# Patient Record
Sex: MALE | Race: WHITE | NOT HISPANIC OR LATINO | URBAN - METROPOLITAN AREA
[De-identification: names, ages, dates, MRNs, and addresses within clinical notes are randomized per-mention and may not be internally consistent; named-entity substitution may affect disease eponyms.]

---

## 2017-04-21 ENCOUNTER — OUTPATIENT (OUTPATIENT)
Dept: OUTPATIENT SERVICES | Facility: HOSPITAL | Age: 72
LOS: 1 days | Discharge: HOME | End: 2017-04-21

## 2017-06-27 DIAGNOSIS — C67.9 MALIGNANT NEOPLASM OF BLADDER, UNSPECIFIED: ICD-10-CM

## 2017-06-27 DIAGNOSIS — I25.10 ATHEROSCLEROTIC HEART DISEASE OF NATIVE CORONARY ARTERY WITHOUT ANGINA PECTORIS: ICD-10-CM

## 2017-06-27 DIAGNOSIS — G47.33 OBSTRUCTIVE SLEEP APNEA (ADULT) (PEDIATRIC): ICD-10-CM

## 2017-06-27 DIAGNOSIS — E78.5 HYPERLIPIDEMIA, UNSPECIFIED: ICD-10-CM

## 2017-06-27 DIAGNOSIS — I10 ESSENTIAL (PRIMARY) HYPERTENSION: ICD-10-CM

## 2017-06-27 DIAGNOSIS — J45.909 UNSPECIFIED ASTHMA, UNCOMPLICATED: ICD-10-CM

## 2017-06-27 DIAGNOSIS — J44.9 CHRONIC OBSTRUCTIVE PULMONARY DISEASE, UNSPECIFIED: ICD-10-CM

## 2017-12-29 PROBLEM — Z00.00 ENCOUNTER FOR PREVENTIVE HEALTH EXAMINATION: Status: ACTIVE | Noted: 2017-12-29

## 2018-02-02 ENCOUNTER — APPOINTMENT (OUTPATIENT)
Dept: UROLOGY | Facility: CLINIC | Age: 73
End: 2018-02-02
Payer: MEDICARE

## 2018-02-02 VITALS
WEIGHT: 218 LBS | HEART RATE: 57 BPM | HEIGHT: 71 IN | DIASTOLIC BLOOD PRESSURE: 79 MMHG | BODY MASS INDEX: 30.52 KG/M2 | SYSTOLIC BLOOD PRESSURE: 138 MMHG

## 2018-02-02 LAB
BILIRUB UR QL STRIP: NORMAL
CLARITY UR: CLEAR
COLLECTION METHOD: NORMAL
GLUCOSE UR-MCNC: NORMAL
HCG UR QL: NORMAL EU/DL
HGB UR QL STRIP.AUTO: NORMAL
KETONES UR-MCNC: NORMAL
LEUKOCYTE ESTERASE UR QL STRIP: NORMAL
NITRITE UR QL STRIP: NORMAL
PH UR STRIP: 6
PROT UR STRIP-MCNC: NORMAL
SP GR UR STRIP: 1.02

## 2018-02-02 PROCEDURE — 99214 OFFICE O/P EST MOD 30 MIN: CPT | Mod: 25

## 2018-02-02 PROCEDURE — 52000 CYSTOURETHROSCOPY: CPT

## 2018-02-02 PROCEDURE — 81003 URINALYSIS AUTO W/O SCOPE: CPT | Mod: QW

## 2018-02-02 RX ORDER — NYSTATIN AND TRIAMCINOLONE ACETONIDE 100000; 1 MG/G; MG/G
100000-0.1 CREAM TOPICAL
Qty: 30 | Refills: 0 | Status: ACTIVE | COMMUNITY
Start: 2017-11-20

## 2018-02-02 RX ORDER — HYDROXYCHLOROQUINE SULFATE 200 MG/1
200 TABLET, FILM COATED ORAL
Qty: 180 | Refills: 0 | Status: ACTIVE | COMMUNITY
Start: 2017-04-24

## 2018-02-02 RX ORDER — NEOMYCIN SULFATE, POLYMYXIN B SULFATE AND DEXAMETHASONE 3.5; 10000; 1 MG/ML; [USP'U]/ML; MG/ML
3.5-10000-0.1 SUSPENSION OPHTHALMIC
Qty: 5 | Refills: 0 | Status: ACTIVE | COMMUNITY
Start: 2017-10-23

## 2018-02-02 RX ORDER — PREDNISONE 5 MG/1
5 TABLET ORAL
Qty: 270 | Refills: 0 | Status: ACTIVE | COMMUNITY
Start: 2017-04-24

## 2018-02-02 RX ORDER — ATENOLOL 25 MG/1
25 TABLET ORAL
Qty: 90 | Refills: 0 | Status: ACTIVE | COMMUNITY
Start: 2016-12-27

## 2018-02-05 ENCOUNTER — RX RENEWAL (OUTPATIENT)
Age: 73
End: 2018-02-05

## 2018-02-05 ENCOUNTER — EMERGENCY (EMERGENCY)
Facility: HOSPITAL | Age: 73
LOS: 1 days | Discharge: HOME | End: 2018-02-05

## 2018-02-05 VITALS
HEART RATE: 72 BPM | TEMPERATURE: 100 F | SYSTOLIC BLOOD PRESSURE: 135 MMHG | DIASTOLIC BLOOD PRESSURE: 88 MMHG | OXYGEN SATURATION: 99 % | RESPIRATION RATE: 18 BRPM

## 2018-02-06 RX ORDER — CIPROFLOXACIN LACTATE 400MG/40ML
1 VIAL (ML) INTRAVENOUS
Qty: 0 | Refills: 0 | COMMUNITY
Start: 2018-02-06 | End: 2018-02-16

## 2018-02-09 ENCOUNTER — APPOINTMENT (OUTPATIENT)
Dept: UROLOGY | Facility: CLINIC | Age: 73
End: 2018-02-09
Payer: MEDICARE

## 2018-02-09 ENCOUNTER — OUTPATIENT (OUTPATIENT)
Dept: OUTPATIENT SERVICES | Facility: HOSPITAL | Age: 73
LOS: 1 days | Discharge: HOME | End: 2018-02-09

## 2018-02-09 VITALS
SYSTOLIC BLOOD PRESSURE: 112 MMHG | HEIGHT: 71 IN | DIASTOLIC BLOOD PRESSURE: 73 MMHG | BODY MASS INDEX: 30.52 KG/M2 | HEART RATE: 61 BPM | WEIGHT: 218 LBS

## 2018-02-09 VITALS
RESPIRATION RATE: 18 BRPM | TEMPERATURE: 97 F | DIASTOLIC BLOOD PRESSURE: 76 MMHG | SYSTOLIC BLOOD PRESSURE: 118 MMHG | OXYGEN SATURATION: 98 % | HEART RATE: 64 BPM | WEIGHT: 218.04 LBS | HEIGHT: 71 IN

## 2018-02-09 DIAGNOSIS — C67.9 MALIGNANT NEOPLASM OF BLADDER, UNSPECIFIED: ICD-10-CM

## 2018-02-09 DIAGNOSIS — Z98.890 OTHER SPECIFIED POSTPROCEDURAL STATES: Chronic | ICD-10-CM

## 2018-02-09 DIAGNOSIS — Z01.818 ENCOUNTER FOR OTHER PREPROCEDURAL EXAMINATION: ICD-10-CM

## 2018-02-09 LAB
APPEARANCE UR: (no result)
APTT BLD: 30.4 SEC — SIGNIFICANT CHANGE UP (ref 27–39.2)
BACTERIA # UR AUTO: (no result) /HPF
BASOPHILS # BLD AUTO: 0.09 K/UL — SIGNIFICANT CHANGE UP (ref 0–0.2)
BASOPHILS NFR BLD AUTO: 1.2 % — HIGH (ref 0–1)
BILIRUB UR QL STRIP: NORMAL
BILIRUB UR-MCNC: NEGATIVE — SIGNIFICANT CHANGE UP
CLARITY UR: CLEAR
COLLECTION METHOD: NORMAL
COLOR SPEC: YELLOW — SIGNIFICANT CHANGE UP
DIFF PNL FLD: (no result)
EOSINOPHIL # BLD AUTO: 0.32 K/UL — SIGNIFICANT CHANGE UP (ref 0–0.7)
EOSINOPHIL NFR BLD AUTO: 4.1 % — SIGNIFICANT CHANGE UP (ref 0–8)
EPI CELLS # UR: (no result) /HPF
GLUCOSE UR QL: NEGATIVE MG/DL — SIGNIFICANT CHANGE UP
GLUCOSE UR-MCNC: NORMAL
HCG UR QL: NORMAL EU/DL
HCT VFR BLD CALC: 35.2 % — LOW (ref 42–52)
HGB BLD-MCNC: 11.6 G/DL — LOW (ref 14–18)
HGB UR QL STRIP.AUTO: 50
IMM GRANULOCYTES NFR BLD AUTO: 0.4 % — HIGH (ref 0.1–0.3)
INR BLD: 1.12 RATIO — SIGNIFICANT CHANGE UP (ref 0.65–1.3)
KETONES UR-MCNC: NEGATIVE — SIGNIFICANT CHANGE UP
KETONES UR-MCNC: NORMAL
LEUKOCYTE ESTERASE UR QL STRIP: 500
LEUKOCYTE ESTERASE UR-ACNC: (no result)
LYMPHOCYTES # BLD AUTO: 2.06 K/UL — SIGNIFICANT CHANGE UP (ref 1.2–3.4)
LYMPHOCYTES # BLD AUTO: 26.6 % — SIGNIFICANT CHANGE UP (ref 20.5–51.1)
MCHC RBC-ENTMCNC: 30.7 PG — SIGNIFICANT CHANGE UP (ref 27–31)
MCHC RBC-ENTMCNC: 33 G/DL — SIGNIFICANT CHANGE UP (ref 32–37)
MCV RBC AUTO: 93.1 FL — SIGNIFICANT CHANGE UP (ref 80–94)
MONOCYTES # BLD AUTO: 0.81 K/UL — HIGH (ref 0.1–0.6)
MONOCYTES NFR BLD AUTO: 10.5 % — HIGH (ref 1.7–9.3)
NEUTROPHILS # BLD AUTO: 4.44 K/UL — SIGNIFICANT CHANGE UP (ref 1.4–6.5)
NEUTROPHILS NFR BLD AUTO: 57.2 % — SIGNIFICANT CHANGE UP (ref 42.2–75.2)
NITRITE UR QL STRIP: NORMAL
NITRITE UR-MCNC: POSITIVE
NRBC # BLD: 0 /100 WBCS — SIGNIFICANT CHANGE UP (ref 0–0)
PH UR STRIP: 5
PH UR: 6 — SIGNIFICANT CHANGE UP (ref 5–8)
PLATELET # BLD AUTO: 223 K/UL — SIGNIFICANT CHANGE UP (ref 130–400)
PROT UR STRIP-MCNC: 30
PROT UR-MCNC: 100 MG/DL
PROTHROM AB SERPL-ACNC: 12.1 SEC — SIGNIFICANT CHANGE UP (ref 9.95–12.87)
RBC # BLD: 3.78 M/UL — LOW (ref 4.7–6.1)
RBC # FLD: 14.6 % — HIGH (ref 11.5–14.5)
RBC CASTS # UR COMP ASSIST: >50 /HPF
SP GR SPEC: 1.02 — SIGNIFICANT CHANGE UP (ref 1.01–1.03)
SP GR UR STRIP: 1.02
UROBILINOGEN FLD QL: 0.2 MG/DL — SIGNIFICANT CHANGE UP (ref 0.2–0.2)
WBC # BLD: 7.75 K/UL — SIGNIFICANT CHANGE UP (ref 4.8–10.8)
WBC # FLD AUTO: 7.75 K/UL — SIGNIFICANT CHANGE UP (ref 4.8–10.8)
WBC UR QL: >50 /HPF

## 2018-02-09 PROCEDURE — 81003 URINALYSIS AUTO W/O SCOPE: CPT | Mod: QW

## 2018-02-09 PROCEDURE — 51798 US URINE CAPACITY MEASURE: CPT

## 2018-02-09 PROCEDURE — 99213 OFFICE O/P EST LOW 20 MIN: CPT

## 2018-02-09 RX ORDER — LANSOPRAZOLE 30 MG/1
30 CAPSULE, DELAYED RELEASE ORAL
Qty: 60 | Refills: 0 | Status: ACTIVE | COMMUNITY
Start: 2018-02-01

## 2018-02-09 RX ORDER — FLUTICASONE PROPIONATE AND SALMETEROL 50; 100 UG/1; UG/1
100-50 POWDER RESPIRATORY (INHALATION)
Qty: 180 | Refills: 0 | Status: ACTIVE | COMMUNITY
Start: 2018-01-05

## 2018-02-09 RX ORDER — ALBUTEROL SULFATE 90 UG/1
108 (90 BASE) AEROSOL, METERED RESPIRATORY (INHALATION)
Qty: 36 | Refills: 0 | Status: ACTIVE | COMMUNITY
Start: 2017-11-03

## 2018-02-09 RX ORDER — FLUTICASONE PROPIONATE 50 UG/1
50 SPRAY, METERED NASAL
Qty: 16 | Refills: 0 | Status: ACTIVE | COMMUNITY
Start: 2017-12-11

## 2018-02-09 NOTE — H&P PST ADULT - ATTENDING COMMENTS
For turbt.  Risks, benefits, alternatives discussed including likely catheter.  Had Ecoli uti and was treated with macrobid.  negative culture this week

## 2018-02-09 NOTE — H&P PST ADULT - HISTORY OF PRESENT ILLNESS
pt with hx bladder ca-- polyps returned now for planned cystoscopy transurethral resection of bladder tumor 2/15/18 with dr mar

## 2018-02-09 NOTE — H&P PST ADULT - REASON FOR ADMISSION
PT CURRENTLY DENIES CP SOB PALPITATIONS, OR URI IN PAST 2 WEEKS  c/o dysuria- on abx  EX MARINA 1-2 FOS W/O SOB

## 2018-02-09 NOTE — H&P PST ADULT - PMH
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

## 2018-02-10 LAB
ALBUMIN SERPL ELPH-MCNC: 3.4 G/DL — SIGNIFICANT CHANGE UP (ref 3–5.5)
ALP SERPL-CCNC: 63 U/L — SIGNIFICANT CHANGE UP (ref 30–115)
ALT FLD-CCNC: 32 U/L — SIGNIFICANT CHANGE UP (ref 0–41)
ANION GAP SERPL CALC-SCNC: 8 MMOL/L — SIGNIFICANT CHANGE UP (ref 7–14)
AST SERPL-CCNC: 39 U/L — SIGNIFICANT CHANGE UP (ref 0–41)
BILIRUB SERPL-MCNC: 0.4 MG/DL — SIGNIFICANT CHANGE UP (ref 0.2–1.2)
BUN SERPL-MCNC: 35 MG/DL — HIGH (ref 10–20)
CALCIUM SERPL-MCNC: 9.7 MG/DL — SIGNIFICANT CHANGE UP (ref 8.5–10.1)
CHLORIDE SERPL-SCNC: 112 MMOL/L — HIGH (ref 98–110)
CO2 SERPL-SCNC: 25 MMOL/L — SIGNIFICANT CHANGE UP (ref 17–32)
CREAT SERPL-MCNC: 1.6 MG/DL — HIGH (ref 0.7–1.5)
GLUCOSE SERPL-MCNC: 87 MG/DL — SIGNIFICANT CHANGE UP (ref 70–110)
POTASSIUM SERPL-MCNC: 4.4 MMOL/L — SIGNIFICANT CHANGE UP (ref 3.5–5)
POTASSIUM SERPL-SCNC: 4.4 MMOL/L — SIGNIFICANT CHANGE UP (ref 3.5–5)
PROT SERPL-MCNC: 6.2 G/DL — SIGNIFICANT CHANGE UP (ref 6–8)
SODIUM SERPL-SCNC: 145 MMOL/L — SIGNIFICANT CHANGE UP (ref 135–146)

## 2018-02-13 ENCOUNTER — RESULT REVIEW (OUTPATIENT)
Age: 73
End: 2018-02-13

## 2018-02-23 ENCOUNTER — OUTPATIENT (OUTPATIENT)
Dept: OUTPATIENT SERVICES | Facility: HOSPITAL | Age: 73
LOS: 1 days | Discharge: HOME | End: 2018-02-23

## 2018-02-23 ENCOUNTER — MESSAGE (OUTPATIENT)
Age: 73
End: 2018-02-23

## 2018-02-23 DIAGNOSIS — Z98.890 OTHER SPECIFIED POSTPROCEDURAL STATES: Chronic | ICD-10-CM

## 2018-02-24 DIAGNOSIS — N39.0 URINARY TRACT INFECTION, SITE NOT SPECIFIED: ICD-10-CM

## 2018-02-27 LAB — BACTERIA UR CULT: NORMAL

## 2018-03-01 ENCOUNTER — RESULT REVIEW (OUTPATIENT)
Age: 73
End: 2018-03-01

## 2018-03-01 ENCOUNTER — APPOINTMENT (OUTPATIENT)
Dept: UROLOGY | Facility: HOSPITAL | Age: 73
End: 2018-03-01
Payer: MEDICARE

## 2018-03-01 ENCOUNTER — OUTPATIENT (OUTPATIENT)
Dept: OUTPATIENT SERVICES | Facility: HOSPITAL | Age: 73
LOS: 1 days | Discharge: HOME | End: 2018-03-01

## 2018-03-01 VITALS
SYSTOLIC BLOOD PRESSURE: 120 MMHG | DIASTOLIC BLOOD PRESSURE: 69 MMHG | WEIGHT: 214.07 LBS | RESPIRATION RATE: 16 BRPM | HEART RATE: 54 BPM | OXYGEN SATURATION: 99 % | HEIGHT: 71 IN | TEMPERATURE: 96 F

## 2018-03-01 VITALS — DIASTOLIC BLOOD PRESSURE: 75 MMHG | HEART RATE: 51 BPM | SYSTOLIC BLOOD PRESSURE: 136 MMHG

## 2018-03-01 DIAGNOSIS — Z98.890 OTHER SPECIFIED POSTPROCEDURAL STATES: Chronic | ICD-10-CM

## 2018-03-01 PROCEDURE — 52214 CYSTOSCOPY AND TREATMENT: CPT | Mod: 59

## 2018-03-01 PROCEDURE — 52204 CYSTOSCOPY W/BIOPSY(S): CPT | Mod: 59

## 2018-03-01 RX ORDER — ONDANSETRON 8 MG/1
4 TABLET, FILM COATED ORAL ONCE
Qty: 0 | Refills: 0 | Status: DISCONTINUED | OUTPATIENT
Start: 2018-03-01 | End: 2018-03-16

## 2018-03-01 RX ORDER — SODIUM CHLORIDE 9 MG/ML
1000 INJECTION, SOLUTION INTRAVENOUS
Qty: 0 | Refills: 0 | Status: DISCONTINUED | OUTPATIENT
Start: 2018-03-01 | End: 2018-03-16

## 2018-03-01 RX ORDER — MORPHINE SULFATE 50 MG/1
2 CAPSULE, EXTENDED RELEASE ORAL
Qty: 0 | Refills: 0 | Status: DISCONTINUED | OUTPATIENT
Start: 2018-03-01 | End: 2018-03-01

## 2018-03-01 RX ORDER — OXYCODONE AND ACETAMINOPHEN 5; 325 MG/1; MG/1
1 TABLET ORAL EVERY 4 HOURS
Qty: 0 | Refills: 0 | Status: DISCONTINUED | OUTPATIENT
Start: 2018-03-01 | End: 2018-03-01

## 2018-03-01 RX ORDER — PHENAZOPYRIDINE HCL 100 MG
1 TABLET ORAL
Qty: 15 | Refills: 0
Start: 2018-03-01 | End: 2018-03-05

## 2018-03-01 RX ORDER — MORPHINE SULFATE 50 MG/1
1 CAPSULE, EXTENDED RELEASE ORAL
Qty: 0 | Refills: 0 | Status: DISCONTINUED | OUTPATIENT
Start: 2018-03-01 | End: 2018-03-01

## 2018-03-01 RX ORDER — PHENAZOPYRIDINE HCL 100 MG
200 TABLET ORAL ONCE
Qty: 0 | Refills: 0 | Status: COMPLETED | OUTPATIENT
Start: 2018-03-01 | End: 2018-03-01

## 2018-03-01 RX ORDER — PHENAZOPYRIDINE HCL 100 MG
1 TABLET ORAL
Qty: 15 | Refills: 0 | OUTPATIENT
Start: 2018-03-01 | End: 2018-03-05

## 2018-03-01 RX ORDER — NITROFURANTOIN MACROCRYSTAL 50 MG
1 CAPSULE ORAL
Qty: 10 | Refills: 0 | OUTPATIENT
Start: 2018-03-01 | End: 2018-03-05

## 2018-03-01 RX ADMIN — Medication 200 MILLIGRAM(S): at 10:31

## 2018-03-01 NOTE — BRIEF OPERATIVE NOTE - PROCEDURE
<<-----Click on this checkbox to enter Procedure Cystoscopy with biopsy and fulguration of bladder  03/01/2018    Active  ARABOY

## 2018-03-01 NOTE — PRE-ANESTHESIA EVALUATION ADULT - MALLAMPATI CLASS
Class II - visualization of the soft palate, fauces, and uvula
Class III - visualization of the soft palate and the base of the uvula

## 2018-03-01 NOTE — ASU PATIENT PROFILE, ADULT - PMH
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

## 2018-03-02 ENCOUNTER — APPOINTMENT (OUTPATIENT)
Dept: UROLOGY | Facility: CLINIC | Age: 73
End: 2018-03-02
Payer: MEDICARE

## 2018-03-02 VITALS
HEIGHT: 71 IN | SYSTOLIC BLOOD PRESSURE: 127 MMHG | BODY MASS INDEX: 29.82 KG/M2 | DIASTOLIC BLOOD PRESSURE: 78 MMHG | WEIGHT: 213 LBS | HEART RATE: 59 BPM

## 2018-03-02 PROCEDURE — 99212 OFFICE O/P EST SF 10 MIN: CPT

## 2018-03-07 DIAGNOSIS — F10.10 ALCOHOL ABUSE, UNCOMPLICATED: ICD-10-CM

## 2018-03-07 DIAGNOSIS — D49.4 NEOPLASM OF UNSPECIFIED BEHAVIOR OF BLADDER: ICD-10-CM

## 2018-03-07 DIAGNOSIS — I10 ESSENTIAL (PRIMARY) HYPERTENSION: ICD-10-CM

## 2018-03-07 DIAGNOSIS — M35.00 SJOGREN SYNDROME, UNSPECIFIED: ICD-10-CM

## 2018-03-07 DIAGNOSIS — J44.9 CHRONIC OBSTRUCTIVE PULMONARY DISEASE, UNSPECIFIED: ICD-10-CM

## 2018-03-09 LAB — SURGICAL PATHOLOGY STUDY: SIGNIFICANT CHANGE UP

## 2018-03-12 DIAGNOSIS — N32.89 OTHER SPECIFIED DISORDERS OF BLADDER: ICD-10-CM

## 2018-03-12 DIAGNOSIS — N30.20 OTHER CHRONIC CYSTITIS WITHOUT HEMATURIA: ICD-10-CM

## 2018-03-16 ENCOUNTER — OUTPATIENT (OUTPATIENT)
Dept: OUTPATIENT SERVICES | Facility: HOSPITAL | Age: 73
LOS: 1 days | Discharge: HOME | End: 2018-03-16

## 2018-03-16 ENCOUNTER — APPOINTMENT (OUTPATIENT)
Dept: UROLOGY | Facility: CLINIC | Age: 73
End: 2018-03-16
Payer: MEDICARE

## 2018-03-16 VITALS
WEIGHT: 213 LBS | HEIGHT: 71 IN | DIASTOLIC BLOOD PRESSURE: 80 MMHG | SYSTOLIC BLOOD PRESSURE: 131 MMHG | HEART RATE: 72 BPM | BODY MASS INDEX: 29.82 KG/M2

## 2018-03-16 DIAGNOSIS — N39.0 URINARY TRACT INFECTION, SITE NOT SPECIFIED: ICD-10-CM

## 2018-03-16 DIAGNOSIS — Z98.890 OTHER SPECIFIED POSTPROCEDURAL STATES: Chronic | ICD-10-CM

## 2018-03-16 LAB
BILIRUB UR QL STRIP: NORMAL
CLARITY UR: CLEAR
COLLECTION METHOD: NORMAL
GLUCOSE UR-MCNC: NORMAL
HCG UR QL: NORMAL EU/DL
HGB UR QL STRIP.AUTO: 50
KETONES UR-MCNC: NORMAL
LEUKOCYTE ESTERASE UR QL STRIP: 500
NITRITE UR QL STRIP: NORMAL
PH UR STRIP: 5
PROT UR STRIP-MCNC: 30
SP GR UR STRIP: 1.02

## 2018-03-16 PROCEDURE — 99213 OFFICE O/P EST LOW 20 MIN: CPT

## 2018-03-16 PROCEDURE — 81003 URINALYSIS AUTO W/O SCOPE: CPT | Mod: QW

## 2018-03-16 RX ORDER — LATANOPROST/PF 0.005 %
0.01 DROPS OPHTHALMIC (EYE)
Qty: 2 | Refills: 0 | Status: ACTIVE | COMMUNITY
Start: 2018-03-05

## 2018-03-19 LAB — BACTERIA UR CULT: ABNORMAL

## 2018-03-26 ENCOUNTER — LABORATORY RESULT (OUTPATIENT)
Age: 73
End: 2018-03-26

## 2018-03-26 ENCOUNTER — OUTPATIENT (OUTPATIENT)
Dept: OUTPATIENT SERVICES | Facility: HOSPITAL | Age: 73
LOS: 1 days | Discharge: HOME | End: 2018-03-26

## 2018-03-26 DIAGNOSIS — N39.0 URINARY TRACT INFECTION, SITE NOT SPECIFIED: ICD-10-CM

## 2018-03-26 DIAGNOSIS — Z98.890 OTHER SPECIFIED POSTPROCEDURAL STATES: Chronic | ICD-10-CM

## 2018-03-28 LAB
APPEARANCE: CLEAR
BACTERIA UR CULT: NORMAL
BILIRUBIN URINE: NEGATIVE
BLOOD URINE: ABNORMAL
COLOR: YELLOW
GLUCOSE QUALITATIVE U: NEGATIVE MG/DL
KETONES URINE: NEGATIVE
LEUKOCYTE ESTERASE URINE: NEGATIVE
NITRITE URINE: NEGATIVE
PH URINE: 6
PROTEIN URINE: NEGATIVE MG/DL
SPECIFIC GRAVITY URINE: 1.01
UROBILINOGEN URINE: NEGATIVE MG/DL

## 2018-04-11 ENCOUNTER — APPOINTMENT (OUTPATIENT)
Dept: HEMATOLOGY ONCOLOGY | Facility: CLINIC | Age: 73
End: 2018-04-11
Payer: MEDICARE

## 2018-04-11 ENCOUNTER — OUTPATIENT (OUTPATIENT)
Dept: OUTPATIENT SERVICES | Facility: HOSPITAL | Age: 73
LOS: 1 days | Discharge: HOME | End: 2018-04-11

## 2018-04-11 VITALS
DIASTOLIC BLOOD PRESSURE: 78 MMHG | TEMPERATURE: 96 F | RESPIRATION RATE: 14 BRPM | HEIGHT: 71 IN | BODY MASS INDEX: 30.38 KG/M2 | WEIGHT: 217 LBS | SYSTOLIC BLOOD PRESSURE: 127 MMHG | HEART RATE: 54 BPM

## 2018-04-11 DIAGNOSIS — Z98.890 OTHER SPECIFIED POSTPROCEDURAL STATES: Chronic | ICD-10-CM

## 2018-04-11 PROCEDURE — 99213 OFFICE O/P EST LOW 20 MIN: CPT | Mod: 25

## 2018-04-11 PROCEDURE — 51720 TREATMENT OF BLADDER LESION: CPT

## 2018-04-11 RX ORDER — BACILLUS CALMETTE-GUERIN SUBSTRAIN CONNAUGHT LIVE ANTIGEN 81 MG/3ML
50 INJECTION, POWDER, LYOPHILIZED, FOR SOLUTION INTRAVESICAL ONCE
Qty: 0 | Refills: 0 | Status: DISCONTINUED | OUTPATIENT
Start: 2018-04-11 | End: 2018-07-16

## 2018-04-16 ENCOUNTER — APPOINTMENT (OUTPATIENT)
Dept: UROLOGY | Facility: CLINIC | Age: 73
End: 2018-04-16
Payer: MEDICARE

## 2018-04-16 VITALS
HEART RATE: 64 BPM | WEIGHT: 217 LBS | BODY MASS INDEX: 30.38 KG/M2 | SYSTOLIC BLOOD PRESSURE: 123 MMHG | TEMPERATURE: 100.6 F | HEIGHT: 71 IN | DIASTOLIC BLOOD PRESSURE: 67 MMHG

## 2018-04-16 LAB
BILIRUB UR QL STRIP: NORMAL
CLARITY UR: CLEAR
COLLECTION METHOD: NORMAL
GLUCOSE UR-MCNC: NORMAL
HCG UR QL: NORMAL EU/DL
HGB UR QL STRIP.AUTO: NORMAL
KETONES UR-MCNC: NORMAL
LEUKOCYTE ESTERASE UR QL STRIP: 500
NITRITE UR QL STRIP: NORMAL
PH UR STRIP: 6
PROT UR STRIP-MCNC: NORMAL
SP GR UR STRIP: 1.01

## 2018-04-16 PROCEDURE — 81003 URINALYSIS AUTO W/O SCOPE: CPT | Mod: QW

## 2018-04-16 PROCEDURE — 99213 OFFICE O/P EST LOW 20 MIN: CPT

## 2018-04-18 ENCOUNTER — APPOINTMENT (OUTPATIENT)
Dept: HEMATOLOGY ONCOLOGY | Facility: CLINIC | Age: 73
End: 2018-04-18

## 2018-04-18 ENCOUNTER — OTHER (OUTPATIENT)
Age: 73
End: 2018-04-18

## 2018-04-19 ENCOUNTER — MESSAGE (OUTPATIENT)
Age: 73
End: 2018-04-19

## 2018-04-19 LAB — BACTERIA UR CULT: ABNORMAL

## 2018-04-25 ENCOUNTER — APPOINTMENT (OUTPATIENT)
Dept: HEMATOLOGY ONCOLOGY | Facility: CLINIC | Age: 73
End: 2018-04-25

## 2018-04-25 ENCOUNTER — MESSAGE (OUTPATIENT)
Age: 73
End: 2018-04-25

## 2018-05-02 ENCOUNTER — RX RENEWAL (OUTPATIENT)
Age: 73
End: 2018-05-02

## 2018-05-02 ENCOUNTER — APPOINTMENT (OUTPATIENT)
Dept: HEMATOLOGY ONCOLOGY | Facility: CLINIC | Age: 73
End: 2018-05-02

## 2018-05-09 ENCOUNTER — APPOINTMENT (OUTPATIENT)
Dept: HEMATOLOGY ONCOLOGY | Facility: CLINIC | Age: 73
End: 2018-05-09
Payer: MEDICARE

## 2018-05-09 ENCOUNTER — OUTPATIENT (OUTPATIENT)
Dept: OUTPATIENT SERVICES | Facility: HOSPITAL | Age: 73
LOS: 1 days | Discharge: HOME | End: 2018-05-09

## 2018-05-09 DIAGNOSIS — C67.9 MALIGNANT NEOPLASM OF BLADDER, UNSPECIFIED: ICD-10-CM

## 2018-05-09 DIAGNOSIS — Z98.890 OTHER SPECIFIED POSTPROCEDURAL STATES: Chronic | ICD-10-CM

## 2018-05-09 PROCEDURE — 51702 INSERT TEMP BLADDER CATH: CPT

## 2018-05-09 PROCEDURE — 99213 OFFICE O/P EST LOW 20 MIN: CPT | Mod: 25

## 2018-05-09 RX ORDER — BACILLUS CALMETTE-GUERIN SUBSTRAIN CONNAUGHT LIVE ANTIGEN 81 MG/3ML
50 INJECTION, POWDER, LYOPHILIZED, FOR SOLUTION INTRAVESICAL ONCE
Qty: 0 | Refills: 0 | Status: DISCONTINUED | OUTPATIENT
Start: 2018-05-09 | End: 2018-08-13

## 2018-05-10 DIAGNOSIS — C67.9 MALIGNANT NEOPLASM OF BLADDER, UNSPECIFIED: ICD-10-CM

## 2018-05-16 ENCOUNTER — APPOINTMENT (OUTPATIENT)
Dept: HEMATOLOGY ONCOLOGY | Facility: CLINIC | Age: 73
End: 2018-05-16
Payer: MEDICARE

## 2018-05-16 ENCOUNTER — OUTPATIENT (OUTPATIENT)
Dept: OUTPATIENT SERVICES | Facility: HOSPITAL | Age: 73
LOS: 1 days | Discharge: HOME | End: 2018-05-16

## 2018-05-16 VITALS — SYSTOLIC BLOOD PRESSURE: 112 MMHG | DIASTOLIC BLOOD PRESSURE: 70 MMHG | HEART RATE: 52 BPM | OXYGEN SATURATION: 97 %

## 2018-05-16 DIAGNOSIS — Z98.890 OTHER SPECIFIED POSTPROCEDURAL STATES: Chronic | ICD-10-CM

## 2018-05-16 PROCEDURE — 51720 TREATMENT OF BLADDER LESION: CPT

## 2018-05-16 RX ORDER — BACILLUS CALMETTE-GUERIN SUBSTRAIN CONNAUGHT LIVE ANTIGEN 81 MG/3ML
50 INJECTION, POWDER, LYOPHILIZED, FOR SOLUTION INTRAVESICAL ONCE
Qty: 0 | Refills: 0 | Status: DISCONTINUED | OUTPATIENT
Start: 2018-05-16 | End: 2018-08-19

## 2018-05-18 DIAGNOSIS — C67.9 MALIGNANT NEOPLASM OF BLADDER, UNSPECIFIED: ICD-10-CM

## 2018-05-23 ENCOUNTER — OUTPATIENT (OUTPATIENT)
Dept: OUTPATIENT SERVICES | Facility: HOSPITAL | Age: 73
LOS: 1 days | Discharge: HOME | End: 2018-05-23

## 2018-05-23 ENCOUNTER — APPOINTMENT (OUTPATIENT)
Dept: HEMATOLOGY ONCOLOGY | Facility: CLINIC | Age: 73
End: 2018-05-23
Payer: COMMERCIAL

## 2018-05-23 VITALS
DIASTOLIC BLOOD PRESSURE: 81 MMHG | WEIGHT: 217 LBS | HEIGHT: 71 IN | HEART RATE: 57 BPM | RESPIRATION RATE: 14 BRPM | SYSTOLIC BLOOD PRESSURE: 134 MMHG | BODY MASS INDEX: 30.38 KG/M2 | TEMPERATURE: 96.3 F

## 2018-05-23 DIAGNOSIS — Z98.890 OTHER SPECIFIED POSTPROCEDURAL STATES: Chronic | ICD-10-CM

## 2018-05-23 RX ORDER — BACILLUS CALMETTE-GUERIN SUBSTRAIN CONNAUGHT LIVE ANTIGEN 81 MG/3ML
50 INJECTION, POWDER, LYOPHILIZED, FOR SOLUTION INTRAVESICAL ONCE
Qty: 0 | Refills: 0 | Status: DISCONTINUED | OUTPATIENT
Start: 2018-05-23 | End: 2018-08-27

## 2018-05-24 DIAGNOSIS — C67.9 MALIGNANT NEOPLASM OF BLADDER, UNSPECIFIED: ICD-10-CM

## 2018-05-30 ENCOUNTER — APPOINTMENT (OUTPATIENT)
Dept: HEMATOLOGY ONCOLOGY | Facility: CLINIC | Age: 73
End: 2018-05-30
Payer: COMMERCIAL

## 2018-05-30 ENCOUNTER — OUTPATIENT (OUTPATIENT)
Dept: OUTPATIENT SERVICES | Facility: HOSPITAL | Age: 73
LOS: 1 days | Discharge: HOME | End: 2018-05-30

## 2018-05-30 VITALS
RESPIRATION RATE: 16 BRPM | SYSTOLIC BLOOD PRESSURE: 117 MMHG | DIASTOLIC BLOOD PRESSURE: 83 MMHG | WEIGHT: 217 LBS | HEART RATE: 54 BPM | BODY MASS INDEX: 30.38 KG/M2 | TEMPERATURE: 96.1 F | HEIGHT: 71 IN

## 2018-05-30 DIAGNOSIS — Z98.890 OTHER SPECIFIED POSTPROCEDURAL STATES: Chronic | ICD-10-CM

## 2018-05-30 PROCEDURE — 51720 TREATMENT OF BLADDER LESION: CPT

## 2018-05-30 RX ORDER — BACILLUS CALMETTE-GUERIN SUBSTRAIN CONNAUGHT LIVE ANTIGEN 81 MG/3ML
50 INJECTION, POWDER, LYOPHILIZED, FOR SOLUTION INTRAVESICAL ONCE
Qty: 0 | Refills: 0 | Status: COMPLETED | OUTPATIENT
Start: 2018-05-30 | End: 2018-05-30

## 2018-05-30 RX ADMIN — BACILLUS CALMETTE-GUERIN SUBSTRAIN CONNAUGHT LIVE ANTIGEN 50 MILLIGRAM(S): 81 INJECTION, POWDER, LYOPHILIZED, FOR SOLUTION INTRAVESICAL at 14:30

## 2018-05-31 DIAGNOSIS — C67.9 MALIGNANT NEOPLASM OF BLADDER, UNSPECIFIED: ICD-10-CM

## 2018-06-06 ENCOUNTER — OUTPATIENT (OUTPATIENT)
Dept: OUTPATIENT SERVICES | Facility: HOSPITAL | Age: 73
LOS: 1 days | Discharge: HOME | End: 2018-06-06

## 2018-06-06 ENCOUNTER — APPOINTMENT (OUTPATIENT)
Dept: HEMATOLOGY ONCOLOGY | Facility: CLINIC | Age: 73
End: 2018-06-06
Payer: COMMERCIAL

## 2018-06-06 VITALS
HEART RATE: 58 BPM | DIASTOLIC BLOOD PRESSURE: 62 MMHG | RESPIRATION RATE: 18 BRPM | WEIGHT: 217 LBS | SYSTOLIC BLOOD PRESSURE: 118 MMHG | HEIGHT: 71 IN | BODY MASS INDEX: 30.38 KG/M2 | TEMPERATURE: 97.8 F

## 2018-06-06 DIAGNOSIS — Z98.890 OTHER SPECIFIED POSTPROCEDURAL STATES: Chronic | ICD-10-CM

## 2018-06-06 PROCEDURE — 51720 TREATMENT OF BLADDER LESION: CPT

## 2018-06-06 RX ORDER — BACILLUS CALMETTE-GUERIN SUBSTRAIN CONNAUGHT LIVE ANTIGEN 81 MG/3ML
50 INJECTION, POWDER, LYOPHILIZED, FOR SOLUTION INTRAVESICAL ONCE
Qty: 0 | Refills: 0 | Status: COMPLETED | OUTPATIENT
Start: 2018-06-06 | End: 2018-06-06

## 2018-06-06 RX ADMIN — BACILLUS CALMETTE-GUERIN SUBSTRAIN CONNAUGHT LIVE ANTIGEN 50 MILLIGRAM(S): 81 INJECTION, POWDER, LYOPHILIZED, FOR SOLUTION INTRAVESICAL at 13:46

## 2018-06-07 DIAGNOSIS — C67.9 MALIGNANT NEOPLASM OF BLADDER, UNSPECIFIED: ICD-10-CM

## 2018-07-03 ENCOUNTER — APPOINTMENT (OUTPATIENT)
Dept: UROLOGY | Facility: CLINIC | Age: 73
End: 2018-07-03
Payer: MEDICARE

## 2018-07-03 ENCOUNTER — OUTPATIENT (OUTPATIENT)
Dept: OUTPATIENT SERVICES | Facility: HOSPITAL | Age: 73
LOS: 1 days | Discharge: HOME | End: 2018-07-03

## 2018-07-03 VITALS
SYSTOLIC BLOOD PRESSURE: 109 MMHG | TEMPERATURE: 98.5 F | HEIGHT: 71 IN | BODY MASS INDEX: 30.8 KG/M2 | HEART RATE: 75 BPM | DIASTOLIC BLOOD PRESSURE: 66 MMHG | WEIGHT: 220 LBS

## 2018-07-03 DIAGNOSIS — Z98.890 OTHER SPECIFIED POSTPROCEDURAL STATES: Chronic | ICD-10-CM

## 2018-07-03 LAB
BILIRUB UR QL STRIP: NORMAL
CLARITY UR: CLEAR
COLLECTION METHOD: NORMAL
GLUCOSE UR-MCNC: NORMAL
HCG UR QL: NORMAL EU/DL
HGB UR QL STRIP.AUTO: NORMAL
KETONES UR-MCNC: NORMAL
LEUKOCYTE ESTERASE UR QL STRIP: 75
NITRITE UR QL STRIP: NORMAL
PH UR STRIP: 5
PROT UR STRIP-MCNC: 30
SP GR UR STRIP: 1025

## 2018-07-03 PROCEDURE — 81003 URINALYSIS AUTO W/O SCOPE: CPT | Mod: QW

## 2018-07-03 PROCEDURE — 99213 OFFICE O/P EST LOW 20 MIN: CPT

## 2018-07-03 RX ORDER — GUAIFENESIN 600 MG/1
600 TABLET, EXTENDED RELEASE ORAL
Qty: 60 | Refills: 0 | Status: ACTIVE | COMMUNITY
Start: 2018-03-21

## 2018-07-03 RX ORDER — FLUTICASONE PROPIONATE AND SALMETEROL 50; 250 UG/1; UG/1
250-50 POWDER RESPIRATORY (INHALATION)
Qty: 60 | Refills: 0 | Status: ACTIVE | COMMUNITY
Start: 2018-03-21

## 2018-07-03 RX ORDER — METHYLPREDNISOLONE 4 MG/1
4 TABLET ORAL
Qty: 21 | Refills: 0 | Status: ACTIVE | COMMUNITY
Start: 2018-05-19

## 2018-07-03 RX ORDER — SODIUM CHLORIDE/SODIUM BICARB
PACKET (EA) NASAL
Qty: 100 | Refills: 0 | Status: ACTIVE | COMMUNITY
Start: 2017-07-19

## 2018-07-03 RX ORDER — LEVOCETIRIZINE DIHYDROCHLORIDE 5 MG/1
5 TABLET ORAL
Qty: 90 | Refills: 0 | Status: ACTIVE | COMMUNITY
Start: 2018-03-21

## 2018-07-03 RX ORDER — AZELASTINE HYDROCHLORIDE 137 UG/1
0.1 SPRAY, METERED NASAL
Qty: 30 | Refills: 0 | Status: ACTIVE | COMMUNITY
Start: 2017-07-25

## 2018-07-04 DIAGNOSIS — N39.0 URINARY TRACT INFECTION, SITE NOT SPECIFIED: ICD-10-CM

## 2018-07-09 LAB — BACTERIA UR CULT: NORMAL

## 2018-07-10 ENCOUNTER — APPOINTMENT (OUTPATIENT)
Dept: UROLOGY | Facility: CLINIC | Age: 73
End: 2018-07-10
Payer: MEDICARE

## 2018-07-10 ENCOUNTER — OUTPATIENT (OUTPATIENT)
Dept: OUTPATIENT SERVICES | Facility: HOSPITAL | Age: 73
LOS: 1 days | Discharge: HOME | End: 2018-07-10

## 2018-07-10 VITALS
WEIGHT: 220 LBS | SYSTOLIC BLOOD PRESSURE: 117 MMHG | DIASTOLIC BLOOD PRESSURE: 76 MMHG | BODY MASS INDEX: 30.8 KG/M2 | HEIGHT: 71 IN | HEART RATE: 66 BPM

## 2018-07-10 DIAGNOSIS — Z98.890 OTHER SPECIFIED POSTPROCEDURAL STATES: Chronic | ICD-10-CM

## 2018-07-10 PROCEDURE — 81003 URINALYSIS AUTO W/O SCOPE: CPT | Mod: QW

## 2018-07-10 PROCEDURE — 52000 CYSTOURETHROSCOPY: CPT

## 2018-07-11 DIAGNOSIS — C67.9 MALIGNANT NEOPLASM OF BLADDER, UNSPECIFIED: ICD-10-CM

## 2018-07-17 PROBLEM — D70.8 OTHER NEUTROPENIA: Chronic | Status: ACTIVE | Noted: 2018-02-09

## 2018-09-11 ENCOUNTER — OUTPATIENT (OUTPATIENT)
Dept: OUTPATIENT SERVICES | Facility: HOSPITAL | Age: 73
LOS: 1 days | Discharge: HOME | End: 2018-09-11

## 2018-09-11 ENCOUNTER — APPOINTMENT (OUTPATIENT)
Dept: UROLOGY | Facility: CLINIC | Age: 73
End: 2018-09-11
Payer: MEDICARE

## 2018-09-11 VITALS
HEART RATE: 68 BPM | BODY MASS INDEX: 30.8 KG/M2 | HEIGHT: 71 IN | WEIGHT: 220 LBS | DIASTOLIC BLOOD PRESSURE: 71 MMHG | SYSTOLIC BLOOD PRESSURE: 116 MMHG

## 2018-09-11 DIAGNOSIS — Z98.890 OTHER SPECIFIED POSTPROCEDURAL STATES: Chronic | ICD-10-CM

## 2018-09-11 DIAGNOSIS — N39.0 URINARY TRACT INFECTION, SITE NOT SPECIFIED: ICD-10-CM

## 2018-09-11 PROBLEM — J44.9 CHRONIC OBSTRUCTIVE PULMONARY DISEASE, UNSPECIFIED: Chronic | Status: ACTIVE | Noted: 2018-02-09

## 2018-09-11 PROBLEM — H40.9 UNSPECIFIED GLAUCOMA: Chronic | Status: ACTIVE | Noted: 2018-02-09

## 2018-09-11 PROBLEM — C80.1 MALIGNANT (PRIMARY) NEOPLASM, UNSPECIFIED: Chronic | Status: ACTIVE | Noted: 2018-02-09

## 2018-09-11 PROBLEM — M35.00 SJOGREN SYNDROME, UNSPECIFIED: Chronic | Status: ACTIVE | Noted: 2018-02-09

## 2018-09-11 PROBLEM — I10 ESSENTIAL (PRIMARY) HYPERTENSION: Chronic | Status: ACTIVE | Noted: 2018-02-09

## 2018-09-11 PROBLEM — K21.9 GASTRO-ESOPHAGEAL REFLUX DISEASE WITHOUT ESOPHAGITIS: Chronic | Status: ACTIVE | Noted: 2018-02-09

## 2018-09-11 PROBLEM — I31.9 DISEASE OF PERICARDIUM, UNSPECIFIED: Chronic | Status: ACTIVE | Noted: 2018-02-09

## 2018-09-11 LAB
BILIRUB UR QL STRIP: NORMAL
CLARITY UR: CLEAR
COLLECTION METHOD: NORMAL
GLUCOSE UR-MCNC: NORMAL
HCG UR QL: NORMAL EU/DL
HGB UR QL STRIP.AUTO: NORMAL
KETONES UR-MCNC: NORMAL
LEUKOCYTE ESTERASE UR QL STRIP: 75
NITRITE UR QL STRIP: NORMAL
PH UR STRIP: 5
PROT UR STRIP-MCNC: NORMAL
SP GR UR STRIP: 1015

## 2018-09-11 PROCEDURE — 81003 URINALYSIS AUTO W/O SCOPE: CPT | Mod: QW

## 2018-09-11 PROCEDURE — 99213 OFFICE O/P EST LOW 20 MIN: CPT

## 2018-09-13 LAB — BACTERIA UR CULT: NORMAL

## 2018-10-09 ENCOUNTER — OUTPATIENT (OUTPATIENT)
Dept: OUTPATIENT SERVICES | Facility: HOSPITAL | Age: 73
LOS: 1 days | Discharge: HOME | End: 2018-10-09

## 2018-10-09 ENCOUNTER — APPOINTMENT (OUTPATIENT)
Dept: UROLOGY | Facility: CLINIC | Age: 73
End: 2018-10-09
Payer: MEDICARE

## 2018-10-09 VITALS
BODY MASS INDEX: 30.8 KG/M2 | HEIGHT: 71 IN | WEIGHT: 220 LBS | HEART RATE: 61 BPM | DIASTOLIC BLOOD PRESSURE: 78 MMHG | SYSTOLIC BLOOD PRESSURE: 126 MMHG

## 2018-10-09 DIAGNOSIS — Z98.890 OTHER SPECIFIED POSTPROCEDURAL STATES: Chronic | ICD-10-CM

## 2018-10-09 LAB
BILIRUB UR QL STRIP: NORMAL
CLARITY UR: NORMAL
COLLECTION METHOD: NORMAL
GLUCOSE UR-MCNC: NORMAL
HCG UR QL: NORMAL EU/DL
HGB UR QL STRIP.AUTO: NORMAL
KETONES UR-MCNC: NORMAL
LEUKOCYTE ESTERASE UR QL STRIP: 500
NITRITE UR QL STRIP: NORMAL
PH UR STRIP: 5
PROT UR STRIP-MCNC: NORMAL
SP GR UR STRIP: 1.02

## 2018-10-09 PROCEDURE — 81003 URINALYSIS AUTO W/O SCOPE: CPT | Mod: QW

## 2018-10-09 PROCEDURE — 99213 OFFICE O/P EST LOW 20 MIN: CPT

## 2018-10-10 DIAGNOSIS — N39.0 URINARY TRACT INFECTION, SITE NOT SPECIFIED: ICD-10-CM

## 2018-10-12 LAB — BACTERIA UR CULT: ABNORMAL

## 2018-10-22 ENCOUNTER — INPATIENT (INPATIENT)
Facility: HOSPITAL | Age: 73
LOS: 1 days | Discharge: HOME HEALTH | End: 2018-10-24
Attending: HOSPITALIST | Admitting: HOSPITALIST

## 2018-10-22 VITALS
HEART RATE: 60 BPM | OXYGEN SATURATION: 98 % | SYSTOLIC BLOOD PRESSURE: 129 MMHG | TEMPERATURE: 96 F | RESPIRATION RATE: 18 BRPM | DIASTOLIC BLOOD PRESSURE: 80 MMHG

## 2018-10-22 DIAGNOSIS — Z98.890 OTHER SPECIFIED POSTPROCEDURAL STATES: Chronic | ICD-10-CM

## 2018-10-22 LAB
ALBUMIN SERPL ELPH-MCNC: 4.5 G/DL — SIGNIFICANT CHANGE UP (ref 3.5–5.2)
ALLERGY+IMMUNOLOGY DIAG STUDY NOTE: SIGNIFICANT CHANGE UP
ALP SERPL-CCNC: 54 U/L — SIGNIFICANT CHANGE UP (ref 30–115)
ALT FLD-CCNC: 26 U/L — SIGNIFICANT CHANGE UP (ref 0–41)
ANION GAP SERPL CALC-SCNC: 15 MMOL/L — HIGH (ref 7–14)
ANION GAP SERPL CALC-SCNC: 9 MMOL/L — SIGNIFICANT CHANGE UP (ref 7–14)
APPEARANCE UR: CLEAR — SIGNIFICANT CHANGE UP
APTT BLD: 29.7 SEC — SIGNIFICANT CHANGE UP (ref 27–39.2)
AST SERPL-CCNC: 78 U/L — HIGH (ref 0–41)
BACTERIA # UR AUTO: ABNORMAL
BASE EXCESS BLDV CALC-SCNC: -0.7 MMOL/L — SIGNIFICANT CHANGE UP (ref -2–2)
BASOPHILS # BLD AUTO: 0.11 K/UL — SIGNIFICANT CHANGE UP (ref 0–0.2)
BASOPHILS NFR BLD AUTO: 1.3 % — HIGH (ref 0–1)
BILIRUB SERPL-MCNC: 0.3 MG/DL — SIGNIFICANT CHANGE UP (ref 0.2–1.2)
BILIRUB UR-MCNC: NEGATIVE — SIGNIFICANT CHANGE UP
BUN SERPL-MCNC: 36 MG/DL — HIGH (ref 10–20)
BUN SERPL-MCNC: 39 MG/DL — HIGH (ref 10–20)
CA-I SERPL-SCNC: 1.24 MMOL/L — SIGNIFICANT CHANGE UP (ref 1.12–1.3)
CALCIUM SERPL-MCNC: 9.1 MG/DL — SIGNIFICANT CHANGE UP (ref 8.5–10.1)
CALCIUM SERPL-MCNC: 9.3 MG/DL — SIGNIFICANT CHANGE UP (ref 8.5–10.1)
CHLORIDE SERPL-SCNC: 102 MMOL/L — SIGNIFICANT CHANGE UP (ref 98–110)
CHLORIDE SERPL-SCNC: 104 MMOL/L — SIGNIFICANT CHANGE UP (ref 98–110)
CO2 SERPL-SCNC: 24 MMOL/L — SIGNIFICANT CHANGE UP (ref 17–32)
CO2 SERPL-SCNC: 28 MMOL/L — SIGNIFICANT CHANGE UP (ref 17–32)
COLOR SPEC: YELLOW — SIGNIFICANT CHANGE UP
CREAT SERPL-MCNC: 1.6 MG/DL — HIGH (ref 0.7–1.5)
CREAT SERPL-MCNC: 1.7 MG/DL — HIGH (ref 0.7–1.5)
DIFF PNL FLD: NEGATIVE — SIGNIFICANT CHANGE UP
EOSINOPHIL # BLD AUTO: 0.29 K/UL — SIGNIFICANT CHANGE UP (ref 0–0.7)
EOSINOPHIL NFR BLD AUTO: 3.5 % — SIGNIFICANT CHANGE UP (ref 0–8)
EPI CELLS # UR: ABNORMAL /HPF
GAS PNL BLDV: 140 MMOL/L — SIGNIFICANT CHANGE UP (ref 136–145)
GAS PNL BLDV: SIGNIFICANT CHANGE UP
GLUCOSE SERPL-MCNC: 106 MG/DL — HIGH (ref 70–99)
GLUCOSE SERPL-MCNC: 85 MG/DL — SIGNIFICANT CHANGE UP (ref 70–99)
GLUCOSE UR QL: NEGATIVE MG/DL — SIGNIFICANT CHANGE UP
HCO3 BLDV-SCNC: 26 MMOL/L — SIGNIFICANT CHANGE UP (ref 22–29)
HCT VFR BLD CALC: 36.7 % — LOW (ref 42–52)
HCT VFR BLDA CALC: 53 % — HIGH (ref 34–44)
HGB BLD CALC-MCNC: 17.3 G/DL — SIGNIFICANT CHANGE UP (ref 14–18)
HGB BLD-MCNC: 12 G/DL — LOW (ref 14–18)
HOROWITZ INDEX BLDV+IHG-RTO: 21 — SIGNIFICANT CHANGE UP
IMM GRANULOCYTES NFR BLD AUTO: 0.1 % — SIGNIFICANT CHANGE UP (ref 0.1–0.3)
INR BLD: 1.06 RATIO — SIGNIFICANT CHANGE UP (ref 0.65–1.3)
KETONES UR-MCNC: NEGATIVE — SIGNIFICANT CHANGE UP
LACTATE BLDV-MCNC: 1.2 MMOL/L — SIGNIFICANT CHANGE UP (ref 0.5–1.6)
LACTATE SERPL-SCNC: 1.7 MMOL/L — SIGNIFICANT CHANGE UP (ref 0.5–2.2)
LEUKOCYTE ESTERASE UR-ACNC: ABNORMAL
LYMPHOCYTES # BLD AUTO: 2.46 K/UL — SIGNIFICANT CHANGE UP (ref 1.2–3.4)
LYMPHOCYTES # BLD AUTO: 29.5 % — SIGNIFICANT CHANGE UP (ref 20.5–51.1)
MCHC RBC-ENTMCNC: 30 PG — SIGNIFICANT CHANGE UP (ref 27–31)
MCHC RBC-ENTMCNC: 32.7 G/DL — SIGNIFICANT CHANGE UP (ref 32–37)
MCV RBC AUTO: 91.8 FL — SIGNIFICANT CHANGE UP (ref 80–94)
MONOCYTES # BLD AUTO: 0.7 K/UL — HIGH (ref 0.1–0.6)
MONOCYTES NFR BLD AUTO: 8.4 % — SIGNIFICANT CHANGE UP (ref 1.7–9.3)
NEUTROPHILS # BLD AUTO: 4.76 K/UL — SIGNIFICANT CHANGE UP (ref 1.4–6.5)
NEUTROPHILS NFR BLD AUTO: 57.2 % — SIGNIFICANT CHANGE UP (ref 42.2–75.2)
NITRITE UR-MCNC: NEGATIVE — SIGNIFICANT CHANGE UP
NRBC # BLD: 0 /100 WBCS — SIGNIFICANT CHANGE UP (ref 0–0)
PCO2 BLDV: 47 MMHG — SIGNIFICANT CHANGE UP (ref 41–51)
PH BLDV: 7.34 — SIGNIFICANT CHANGE UP (ref 7.26–7.43)
PH UR: 6 — SIGNIFICANT CHANGE UP (ref 5–8)
PLATELET # BLD AUTO: 207 K/UL — SIGNIFICANT CHANGE UP (ref 130–400)
PO2 BLDV: 42 MMHG — HIGH (ref 20–40)
POTASSIUM BLDV-SCNC: 4.4 MMOL/L — SIGNIFICANT CHANGE UP (ref 3.3–5.6)
POTASSIUM SERPL-MCNC: 4.2 MMOL/L — SIGNIFICANT CHANGE UP (ref 3.5–5)
POTASSIUM SERPL-MCNC: 6.5 MMOL/L — CRITICAL HIGH (ref 3.5–5)
POTASSIUM SERPL-SCNC: 4.2 MMOL/L — SIGNIFICANT CHANGE UP (ref 3.5–5)
POTASSIUM SERPL-SCNC: 6.5 MMOL/L — CRITICAL HIGH (ref 3.5–5)
PROT SERPL-MCNC: 7.3 G/DL — SIGNIFICANT CHANGE UP (ref 6–8)
PROT UR-MCNC: NEGATIVE MG/DL — SIGNIFICANT CHANGE UP
PROTHROM AB SERPL-ACNC: 11.5 SEC — SIGNIFICANT CHANGE UP (ref 9.95–12.87)
RBC # BLD: 4 M/UL — LOW (ref 4.7–6.1)
RBC # FLD: 15.9 % — HIGH (ref 11.5–14.5)
RBC CASTS # UR COMP ASSIST: SIGNIFICANT CHANGE UP /HPF
SAO2 % BLDV: 71 % — SIGNIFICANT CHANGE UP
SODIUM SERPL-SCNC: 141 MMOL/L — SIGNIFICANT CHANGE UP (ref 135–146)
SODIUM SERPL-SCNC: 141 MMOL/L — SIGNIFICANT CHANGE UP (ref 135–146)
SP GR SPEC: 1.02 — SIGNIFICANT CHANGE UP (ref 1.01–1.03)
TYPE + AB SCN PNL BLD: SIGNIFICANT CHANGE UP
UROBILINOGEN FLD QL: 0.2 MG/DL — SIGNIFICANT CHANGE UP (ref 0.2–0.2)
WBC # BLD: 8.33 K/UL — SIGNIFICANT CHANGE UP (ref 4.8–10.8)
WBC # FLD AUTO: 8.33 K/UL — SIGNIFICANT CHANGE UP (ref 4.8–10.8)
WBC UR QL: ABNORMAL /HPF

## 2018-10-22 RX ORDER — PANTOPRAZOLE SODIUM 20 MG/1
40 TABLET, DELAYED RELEASE ORAL
Qty: 0 | Refills: 0 | Status: DISCONTINUED | OUTPATIENT
Start: 2018-10-22 | End: 2018-10-24

## 2018-10-22 RX ORDER — ALLOPURINOL 300 MG
100 TABLET ORAL DAILY
Qty: 0 | Refills: 0 | Status: DISCONTINUED | OUTPATIENT
Start: 2018-10-22 | End: 2018-10-24

## 2018-10-22 RX ORDER — MEROPENEM 1 G/30ML
1000 INJECTION INTRAVENOUS EVERY 12 HOURS
Qty: 0 | Refills: 0 | Status: DISCONTINUED | OUTPATIENT
Start: 2018-10-22 | End: 2018-10-22

## 2018-10-22 RX ORDER — ENOXAPARIN SODIUM 100 MG/ML
40 INJECTION SUBCUTANEOUS EVERY 24 HOURS
Qty: 0 | Refills: 0 | Status: DISCONTINUED | OUTPATIENT
Start: 2018-10-22 | End: 2018-10-24

## 2018-10-22 RX ORDER — PREGABALIN 225 MG/1
1000 CAPSULE ORAL DAILY
Qty: 0 | Refills: 0 | Status: DISCONTINUED | OUTPATIENT
Start: 2018-10-22 | End: 2018-10-24

## 2018-10-22 RX ORDER — CEFEPIME 1 G/1
1000 INJECTION, POWDER, FOR SOLUTION INTRAMUSCULAR; INTRAVENOUS ONCE
Qty: 0 | Refills: 0 | Status: DISCONTINUED | OUTPATIENT
Start: 2018-10-22 | End: 2018-10-22

## 2018-10-22 RX ORDER — HYDROXYCHLOROQUINE SULFATE 200 MG
200 TABLET ORAL
Qty: 0 | Refills: 0 | Status: DISCONTINUED | OUTPATIENT
Start: 2018-10-22 | End: 2018-10-24

## 2018-10-22 RX ORDER — FOLIC ACID 0.8 MG
1 TABLET ORAL DAILY
Qty: 0 | Refills: 0 | Status: DISCONTINUED | OUTPATIENT
Start: 2018-10-22 | End: 2018-10-24

## 2018-10-22 RX ORDER — MEROPENEM 1 G/30ML
1000 INJECTION INTRAVENOUS EVERY 8 HOURS
Qty: 0 | Refills: 0 | Status: DISCONTINUED | OUTPATIENT
Start: 2018-10-22 | End: 2018-10-23

## 2018-10-22 RX ORDER — ASPIRIN/CALCIUM CARB/MAGNESIUM 324 MG
81 TABLET ORAL DAILY
Qty: 0 | Refills: 0 | Status: DISCONTINUED | OUTPATIENT
Start: 2018-10-22 | End: 2018-10-24

## 2018-10-22 RX ORDER — CHOLECALCIFEROL (VITAMIN D3) 125 MCG
1000 CAPSULE ORAL DAILY
Qty: 0 | Refills: 0 | Status: DISCONTINUED | OUTPATIENT
Start: 2018-10-22 | End: 2018-10-24

## 2018-10-22 RX ORDER — ACETAMINOPHEN 500 MG
650 TABLET ORAL EVERY 6 HOURS
Qty: 0 | Refills: 0 | Status: DISCONTINUED | OUTPATIENT
Start: 2018-10-22 | End: 2018-10-24

## 2018-10-22 RX ORDER — INFLUENZA VIRUS VACCINE 15; 15; 15; 15 UG/.5ML; UG/.5ML; UG/.5ML; UG/.5ML
0.5 SUSPENSION INTRAMUSCULAR ONCE
Qty: 0 | Refills: 0 | Status: COMPLETED | OUTPATIENT
Start: 2018-10-22 | End: 2018-10-22

## 2018-10-22 RX ORDER — SODIUM CHLORIDE 9 MG/ML
1000 INJECTION INTRAMUSCULAR; INTRAVENOUS; SUBCUTANEOUS
Qty: 0 | Refills: 0 | Status: DISCONTINUED | OUTPATIENT
Start: 2018-10-22 | End: 2018-10-23

## 2018-10-22 RX ORDER — ATENOLOL 25 MG/1
25 TABLET ORAL DAILY
Qty: 0 | Refills: 0 | Status: DISCONTINUED | OUTPATIENT
Start: 2018-10-22 | End: 2018-10-23

## 2018-10-22 RX ORDER — BUDESONIDE AND FORMOTEROL FUMARATE DIHYDRATE 160; 4.5 UG/1; UG/1
2 AEROSOL RESPIRATORY (INHALATION)
Qty: 0 | Refills: 0 | Status: DISCONTINUED | OUTPATIENT
Start: 2018-10-22 | End: 2018-10-24

## 2018-10-22 RX ORDER — PHENAZOPYRIDINE HCL 100 MG
100 TABLET ORAL EVERY 8 HOURS
Qty: 0 | Refills: 0 | Status: DISCONTINUED | OUTPATIENT
Start: 2018-10-22 | End: 2018-10-23

## 2018-10-22 RX ORDER — AMLODIPINE BESYLATE 2.5 MG/1
5 TABLET ORAL DAILY
Qty: 0 | Refills: 0 | Status: DISCONTINUED | OUTPATIENT
Start: 2018-10-22 | End: 2018-10-22

## 2018-10-22 RX ADMIN — PREGABALIN 1000 MICROGRAM(S): 225 CAPSULE ORAL at 16:32

## 2018-10-22 RX ADMIN — ENOXAPARIN SODIUM 40 MILLIGRAM(S): 100 INJECTION SUBCUTANEOUS at 20:19

## 2018-10-22 RX ADMIN — MEROPENEM 100 MILLIGRAM(S): 1 INJECTION INTRAVENOUS at 12:42

## 2018-10-22 RX ADMIN — PANTOPRAZOLE SODIUM 40 MILLIGRAM(S): 20 TABLET, DELAYED RELEASE ORAL at 14:09

## 2018-10-22 RX ADMIN — Medication 200 MILLIGRAM(S): at 17:32

## 2018-10-22 RX ADMIN — SODIUM CHLORIDE 75 MILLILITER(S): 9 INJECTION INTRAMUSCULAR; INTRAVENOUS; SUBCUTANEOUS at 14:10

## 2018-10-22 RX ADMIN — MEROPENEM 100 MILLIGRAM(S): 1 INJECTION INTRAVENOUS at 21:23

## 2018-10-22 RX ADMIN — Medication 81 MILLIGRAM(S): at 14:09

## 2018-10-22 NOTE — CONSULT NOTE ADULT - SUBJECTIVE AND OBJECTIVE BOX
72 y/o M with PMH HTN, HLD, COPD not on home O2, current bladder cancer (s/p surgery, chemo, scheduled for cystoscopy in 2 wks to target further care), Sjogren's presents for persistent UTI since 2018. Denies any current symptoms. followed by Dr. Fernandes. He sent pt in for IV abx due to resistant organism in urine. pt has been on 5 courses of macrobid, most recently completed a course 2 days ago. 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  Denies CP, palpitations, SOB, back pain, abdominal pain, n/v/d, black or bloody stools, fevers, trauma, fall, cough, recent travel, recent illness, sick contacts, leg pain/swelling, urinary symptoms, rash    Allergies    sulfa drugs (Hives)  sulfamethoxazole (Rash)    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  receives igg therapy  Pericarditis, unspecified chronicity, unspecified type: -- resolved  Chronic obstructive pulmonary disease, unspecified COPD type  Sjogren's syndrome, with unspecified organ involvement  Cataract of both eyes, unspecified cataract type  Glaucoma of both eyes, unspecified glaucoma type  Cancer: bladder  GERD (gastroesophageal reflux disease)  Obstructive sleep apnea syndrome in adult: bi pap  HTN (hypertension)  History of tonsillectomy  H/O cystoscopy: turbt x 5    Home Medications:  Advair Diskus 250 mcg-50 mcg inhalation powder: 1 puff(s) inhaled 2 times a day (01 Mar 2018 07:47)  allopurinol 100 mg oral tablet: 1 tab(s) orally once a day (01 Mar 2018 07:47)  aspirin 81 mg oral tablet: 1 tab(s) orally once a day (01 Mar 2018 07:47)  Atacand HCT 16 mg-12.5 mg oral tablet: 1 tab(s) orally once a day (01 Mar 2018 07:47)  atenolol 25 mg oral tablet: 1 tab(s) orally once a day (01 Mar 2018 07:47)  biotin 5000 mcg oral tablet, disintegratin tab(s) orally once a day (01 Mar 2018 07:47)  bladder supplement: 1 tab(s) orally once a day (01 Mar 2018 07:47)  folic acid 0.8 mg oral tablet: 1 tab(s) orally once a day (01 Mar 2018 07:47)  hydroxychloroquine 200 mg oral tablet: 1 tab(s) orally 2 times a day (01 Mar 2018 07:47)  igg therapy:  (01 Mar 2018 07:47)  lansoprazole 30 mg oral tablet, disintegratin tab(s) orally once a day (01 Mar 2018 07:47)  Levaquin 500 mg oral tablet: 1 tab(s) orally every 24 hours x 6 more days (01 Mar 2018 07:47)  vitamin b: 38286 microgram(s) orally once a day (01 Mar 2018 07:47)  Vitamin D2: 2500 unit(s) orally once a day (01 Mar 2018 07:47)       Review of Systems    Constitutional: (-) fever  Cardiovascular: (-) chest pain, (-) syncope  Respiratory: (-) cough, (-) shortness of breath  Gastrointestinal: (-) vomiting, (-) diarrhea  Musculoskeletal: (-) neck pain, (-) back pain  Integumentary: (-) rash, (-) edema  Neurological: (-) headache      PHYSICAL EXAM:    	GENERAL: Alert, appears stated age, well appearing, non-toxic  	SKIN: Warm, pink and dry  	EYE: Normal lids/conjunctiva  	ENT: Normal hearing, patent oropharynx  	NECK: +supple. No meningismus  	Pulm: Bilateral BS, normal resp effort, no wheezes, stridor, or retractions  	CV: RRR, no M/R/G  	Abd: soft, non-tender, non-distended, no hepatosplenomegaly. no CVA tenderness. no rebound guarding.   	Mskel: no erythema, cyanosis, edema. no calf tenderness     Neuro: AAOx3,  5/5 strength throughout. normal gait.      10-22    141  |  102  |  39<H>  ----------------------------<  85  6.5<HH>   |  24  |  1.7<H>    Ca    9.3      22 Oct 2018 09:23    TPro  7.3  /  Alb  4.5  /  TBili  0.3  /  DBili  x   /  AST  78<H>  /  ALT  26  /  AlkPhos  54  10-22                            12.0   8.33  )-----------( 207      ( 22 Oct 2018 09:23 )             36.7

## 2018-10-22 NOTE — ED ADULT NURSE REASSESSMENT NOTE - NS ED NURSE REASSESS COMMENT FT1
Patient placed in Ed holding no beds avaible at this time report given to Cherie ALICIA, VS stable in no acute distress.

## 2018-10-22 NOTE — CONSULT NOTE ADULT - ASSESSMENT
ESBL/ UTI    - needs IV abx  - Urine culture   - Id cx   - will have cystoscope after course of IVbx and negative cultures    -d/w / isabela

## 2018-10-22 NOTE — ED ADULT NURSE NOTE - CHIEF COMPLAINT QUOTE
patient states he has a drug resistant bacteria in urine, was prescribed antibiotic to control it, but was sent in by MD Chambers for intravenous antibiotic.

## 2018-10-22 NOTE — H&P ADULT - HISTORY OF PRESENT ILLNESS
72 y/o M with PMH HTN, HLD, COPD not on home O2, current bladder cancer (s/p surgery, chemo, scheduled for cystoscopy in 2 wks to target further care), Sjogren's presents for complicated MDR UTI. Patient was treated for recurrent UTIs with multiple antibiotics. Most recently, his culture grew ESBL E. coli for which Dr. Fernandes advised him to present to the ED for IV antibiotics. Denies CP, palpitations, SOB, back pain, abdominal pain, n/v/d, black or bloody stools, fevers, trauma, fall, cough, recent travel, recent illness, sick contacts, leg pain/swelling, urinary symptoms, rash.

## 2018-10-22 NOTE — H&P ADULT - ASSESSMENT
74 y/o M with PMH HTN, HLD, COPD not on home O2, current bladder cancer (s/p surgery, chemo, scheduled for cystoscopy in 2 wks to target further care), Sjogren's presents for complicated MDR UTI. Patient was treated for recurrent UTIs with multiple antibiotics. Most recently, his culture grew ESBL E. coli for which Dr. Fernandes advised him to present to the ED for IV antibiotics. Denies CP, palpitations, SOB, back pain, abdominal pain, n/v/d, black or bloody stools, fevers, trauma, fall, cough, recent travel, recent illness, sick contacts, leg pain/swelling, urinary symptoms, rash.    Problem List:  #ESBL E. Colit Complicated UTI   #Bladder Cancer   #BPH  #HTN  #HLD  #COPD  #Sjogren's   #Chronic Anemia   #ALEE vs CKDIII   #Pseudohyperkalemia     Plan:  - c/w meropenem 1gm q12h   - f/u urine culture   - ID consult   - c/w IVF   - Hold ARB/HCTZ   - Start amlodipine 5mg daily   - c/w home medications   - dvt ppx: Lovenox

## 2018-10-22 NOTE — ED PROVIDER NOTE - PROGRESS NOTE DETAILS
discussed with uro TROY domingo, will come see pt I personally evaluated the patient. I reviewed the Resident’s or Physician Assistant’s note (as assigned above), and agree with the findings and plan except as documented in my note. Pt has a h/o bladder CA and recurrent UTI’s. He noted a foul smelling urine, no dysuria or flank pain, no fever. Urine culture sent by Dr. Fernandes apparently grew a multi resistant organism. Pt is currently ASSx. VS noted. Abd NT, no CVA tenderness. Labs ordered, will obtain a urine CNS from Dr. Fenrandes’s office discussed with Dr. Rene, accepts admission

## 2018-10-22 NOTE — H&P ADULT - NSHPLABSRESULTS_GEN_ALL_CORE
CBC Full  -  ( 22 Oct 2018 09:23 )  WBC Count : 8.33 K/uL  Hemoglobin : 12.0 g/dL  Hematocrit : 36.7 %  Platelet Count - Automated : 207 K/uL  Mean Cell Volume : 91.8 fL  Mean Cell Hemoglobin : 30.0 pg  Mean Cell Hemoglobin Concentration : 32.7 g/dL  Auto Neutrophil # : 4.76 K/uL  Auto Lymphocyte # : 2.46 K/uL  Auto Monocyte # : 0.70 K/uL  Auto Eosinophil # : 0.29 K/uL  Auto Basophil # : 0.11 K/uL  Auto Neutrophil % : 57.2 %  Auto Lymphocyte % : 29.5 %  Auto Monocyte % : 8.4 %  Auto Eosinophil % : 3.5 %  Auto Basophil % : 1.3 %    BMP:    10-22 @ 09:23    Blood Urea Nitrogen - 39  Calcium - 9.3  Carbond Dioxide - 24  Chloride - 102  Creatinine - 1.7  Glucose - 85  Potassium - 6.5  Sodium - 141      Hemoglobin A1c -   PT/INR - ( 22 Oct 2018 09:23 )   PT: 11.50 sec;   INR: 1.06 ratio         PTT - ( 22 Oct 2018 09:23 )  PTT:29.7 sec  Urine Culture:

## 2018-10-22 NOTE — ED ADULT NURSE NOTE - NSIMPLEMENTINTERV_GEN_ALL_ED
Implemented All Fall with Harm Risk Interventions:  [place] to call system. Call bell, personal items and telephone within reach. Instruct patient to call for assistance. Room bathroom lighting operational. Non-slip footwear when patient is off stretcher. Physically safe environment: no spills, clutter or unnecessary equipment. Stretcher in lowest position, wheels locked, appropriate side rails in place. Provide visual cue, wrist band, yellow gown, etc. Monitor gait and stability. Monitor for mental status changes and reorient to person, place, and time. Review medications for side effects contributing to fall risk. Reinforce activity limits and safety measures with patient and family. Provide visual clues: red socks.

## 2018-10-22 NOTE — ED ADULT NURSE NOTE - CHPI ED NUR SYMPTOMS POS
patient states he has a urinary tract infection since Feb, has been taking antibiotic since then but was sent in today for intravenous, states only symptoms are cloudy urine with foul smell

## 2018-10-22 NOTE — ED PROVIDER NOTE - MEDICAL DECISION MAKING DETAILS
dx testing reviewed. In my opinion, in patient treatment is medically justifiable and appropriate.   needs IV abx after review of C & S.

## 2018-10-22 NOTE — ED PROVIDER NOTE - OBJECTIVE STATEMENT
74 y/o M with PMH HTN, HLD, COPD not on home O2, current bladder cancer (s/p surgery, chemo, scheduled for cystoscopy in 2 wks to target further care), Sjogren's presents for persistent UTI since 2/2018. denies current symptoms. followed by Dr. Fernandes. He sent pt in for IV abx due to resistant organism in urine. pt has been on 5 courses of macrobid, most recently completed a course 2 days ago. 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 Denies CP, palpitations, SOB, back pain, abdominal pain, n/v/d, black or bloody stools, fevers, trauma, fall, cough, recent travel, recent illness, sick contacts, leg pain/swelling, urinary symptoms, rash.

## 2018-10-23 DIAGNOSIS — Z16.35 RESISTANCE TO MULTIPLE ANTIMICROBIAL DRUGS: ICD-10-CM

## 2018-10-23 DIAGNOSIS — N39.0 URINARY TRACT INFECTION, SITE NOT SPECIFIED: ICD-10-CM

## 2018-10-23 LAB
ANION GAP SERPL CALC-SCNC: 10 MMOL/L — SIGNIFICANT CHANGE UP (ref 7–14)
BUN SERPL-MCNC: 31 MG/DL — HIGH (ref 10–20)
CALCIUM SERPL-MCNC: 9 MG/DL — SIGNIFICANT CHANGE UP (ref 8.5–10.1)
CHLORIDE SERPL-SCNC: 106 MMOL/L — SIGNIFICANT CHANGE UP (ref 98–110)
CO2 SERPL-SCNC: 26 MMOL/L — SIGNIFICANT CHANGE UP (ref 17–32)
CREAT SERPL-MCNC: 1.6 MG/DL — HIGH (ref 0.7–1.5)
GLUCOSE SERPL-MCNC: 76 MG/DL — SIGNIFICANT CHANGE UP (ref 70–99)
HCT VFR BLD CALC: 35.1 % — LOW (ref 42–52)
HGB BLD-MCNC: 11.5 G/DL — LOW (ref 14–18)
MCHC RBC-ENTMCNC: 30.3 PG — SIGNIFICANT CHANGE UP (ref 27–31)
MCHC RBC-ENTMCNC: 32.8 G/DL — SIGNIFICANT CHANGE UP (ref 32–37)
MCV RBC AUTO: 92.4 FL — SIGNIFICANT CHANGE UP (ref 80–94)
NRBC # BLD: 0 /100 WBCS — SIGNIFICANT CHANGE UP (ref 0–0)
PLATELET # BLD AUTO: 192 K/UL — SIGNIFICANT CHANGE UP (ref 130–400)
POTASSIUM SERPL-MCNC: 4.3 MMOL/L — SIGNIFICANT CHANGE UP (ref 3.5–5)
POTASSIUM SERPL-SCNC: 4.3 MMOL/L — SIGNIFICANT CHANGE UP (ref 3.5–5)
RBC # BLD: 3.8 M/UL — LOW (ref 4.7–6.1)
RBC # FLD: 15.9 % — HIGH (ref 11.5–14.5)
SODIUM SERPL-SCNC: 142 MMOL/L — SIGNIFICANT CHANGE UP (ref 135–146)
WBC # BLD: 6.63 K/UL — SIGNIFICANT CHANGE UP (ref 4.8–10.8)
WBC # FLD AUTO: 6.63 K/UL — SIGNIFICANT CHANGE UP (ref 4.8–10.8)

## 2018-10-23 RX ORDER — CHLORHEXIDINE GLUCONATE 213 G/1000ML
1 SOLUTION TOPICAL
Qty: 0 | Refills: 0 | Status: DISCONTINUED | OUTPATIENT
Start: 2018-10-23 | End: 2018-10-24

## 2018-10-23 RX ORDER — ERTAPENEM SODIUM 1 G/1
500 INJECTION, POWDER, LYOPHILIZED, FOR SOLUTION INTRAMUSCULAR; INTRAVENOUS ONCE
Qty: 0 | Refills: 0 | Status: DISCONTINUED | OUTPATIENT
Start: 2018-10-23 | End: 2018-10-23

## 2018-10-23 RX ORDER — ERTAPENEM SODIUM 1 G/1
500 INJECTION, POWDER, LYOPHILIZED, FOR SOLUTION INTRAMUSCULAR; INTRAVENOUS DAILY
Qty: 0 | Refills: 0 | Status: DISCONTINUED | OUTPATIENT
Start: 2018-10-23 | End: 2018-10-24

## 2018-10-23 RX ADMIN — Medication 1000 UNIT(S): at 11:18

## 2018-10-23 RX ADMIN — ENOXAPARIN SODIUM 40 MILLIGRAM(S): 100 INJECTION SUBCUTANEOUS at 21:09

## 2018-10-23 RX ADMIN — ERTAPENEM SODIUM 110 MILLIGRAM(S): 1 INJECTION, POWDER, LYOPHILIZED, FOR SOLUTION INTRAMUSCULAR; INTRAVENOUS at 11:20

## 2018-10-23 RX ADMIN — Medication 100 MILLIGRAM(S): at 11:18

## 2018-10-23 RX ADMIN — Medication 81 MILLIGRAM(S): at 11:18

## 2018-10-23 RX ADMIN — BUDESONIDE AND FORMOTEROL FUMARATE DIHYDRATE 2 PUFF(S): 160; 4.5 AEROSOL RESPIRATORY (INHALATION) at 07:49

## 2018-10-23 RX ADMIN — SODIUM CHLORIDE 75 MILLILITER(S): 9 INJECTION INTRAMUSCULAR; INTRAVENOUS; SUBCUTANEOUS at 04:16

## 2018-10-23 RX ADMIN — PREGABALIN 1000 MICROGRAM(S): 225 CAPSULE ORAL at 11:18

## 2018-10-23 RX ADMIN — MEROPENEM 100 MILLIGRAM(S): 1 INJECTION INTRAVENOUS at 06:53

## 2018-10-23 RX ADMIN — PANTOPRAZOLE SODIUM 40 MILLIGRAM(S): 20 TABLET, DELAYED RELEASE ORAL at 06:25

## 2018-10-23 RX ADMIN — BUDESONIDE AND FORMOTEROL FUMARATE DIHYDRATE 2 PUFF(S): 160; 4.5 AEROSOL RESPIRATORY (INHALATION) at 21:09

## 2018-10-23 RX ADMIN — Medication 200 MILLIGRAM(S): at 17:34

## 2018-10-23 RX ADMIN — CHLORHEXIDINE GLUCONATE 1 APPLICATION(S): 213 SOLUTION TOPICAL at 13:00

## 2018-10-23 RX ADMIN — Medication 200 MILLIGRAM(S): at 06:25

## 2018-10-23 RX ADMIN — Medication 1 MILLIGRAM(S): at 11:19

## 2018-10-23 RX ADMIN — ATENOLOL 25 MILLIGRAM(S): 25 TABLET ORAL at 06:25

## 2018-10-23 NOTE — PROGRESS NOTE ADULT - ASSESSMENT
72 y/o M with PMH HTN, HLD, COPD not on home O2, current bladder cancer (s/p surgery, chemo, scheduled for cystoscopy in 2 wks to target further care), Sjogren's presents for complicated MDR UTI. Patient was treated for recurrent UTIs with multiple antibiotics. Most recently, his culture grew ESBL E. coli for which Dr. Fernandes advised him to present to the ED for IV antibiotics. Denies CP, palpitations, SOB, back pain, abdominal pain, n/v/d, black or bloody stools, fevers, trauma, fall, cough, recent travel, recent illness, sick contacts, leg pain/swelling, urinary symptoms, rash.    Problem List:  #ESBL E. Colit Complicated UTI   #Bladder Cancer   #BPH  #HTN - on atenolol - discontinued today due to bradycardia   #HLD  #COPD  #Sjogren's   #Chronic Anemia   #CKDIII   #Pseudohyperkalemia - resolved     Plan:  - c/w meropenem 1gm q12h   - f/u urine culture   - ID consult noted - pending PICC line placement, scripts given to CM for abx   - Restart ARB/HCTZ - initially held for possible ALEE   - c/w home medications   - dvt ppx: Lovenox 74 y/o M with PMH HTN, HLD, COPD not on home O2, current bladder cancer (s/p surgery, chemo, scheduled for cystoscopy in 2 wks to target further care), Sjogren's presents for complicated MDR UTI. Patient was treated for recurrent UTIs with multiple antibiotics. Most recently, his culture grew ESBL E. coli for which Dr. Fernandes advised him to present to the ED for IV antibiotics. Denies CP, palpitations, SOB, back pain, abdominal pain, n/v/d, black or bloody stools, fevers, trauma, fall, cough, recent travel, recent illness, sick contacts, leg pain/swelling, urinary symptoms, rash.    Problem List:  #ESBL E. Colit Complicated UTI   #Bladder Cancer   #BPH  #HTN - on atenolol - discontinued today due to bradycardia   #HLD  #COPD  #Sjogren's   #Chronic Anemia   #CKDIII   #Pseudohyperkalemia - resolved     Plan:  - c/w meropenem 1gm q12h   - f/u urine culture   - ID consult noted - pending PICC line placement, scripts given to CM for abx   - Restart ARB/HCTZ (home med Atacand HCT) if becomes hypertensive   - c/w home medications   - dvt ppx: Lovenox

## 2018-10-23 NOTE — PROGRESS NOTE ADULT - SUBJECTIVE AND OBJECTIVE BOX
Pt seen and examined at bedside. Denies any complaints. No CP, SOB.     VITAL SIGNS (Last 24 hrs):  T(C): 35.6 (10-23-18 @ 05:20), Max: 36.3 (10-22-18 @ 17:35)  HR: 50 (10-23-18 @ 05:20) (50 - 56)  BP: 105/68 (10-23-18 @ 05:20) (82/51 - 107/82)  RR: 16 (10-23-18 @ 05:20) (16 - 18)  SpO2: 99% (10-22-18 @ 16:09) (99% - 99%)  Wt(kg): --  Daily Height in cm: 180.34 (22 Oct 2018 17:35)    Daily Weight in k.2 (22 Oct 2018 13:51)    I&O's Summary    22 Oct 2018 07:01  -  23 Oct 2018 07:00  --------------------------------------------------------  IN: 1000 mL / OUT: 0 mL / NET: 1000 mL        PHYSICAL EXAM:  GENERAL: NAD, well-developed  HEAD:  Atraumatic, Normocephalic  EYES: EOMI, PERRLA, conjunctiva and sclera clear  NECK: Supple, No JVD  CHEST/LUNG: Clear to auscultation bilaterally; No wheeze  HEART: Regular rate and rhythm; No murmurs, rubs, or gallops  ABDOMEN: Soft, Nontender, Nondistended; Bowel sounds present  EXTREMITIES:  2+ Peripheral Pulses, No clubbing, cyanosis, or edema  PSYCH: AAOx3  NEUROLOGY: non-focal  SKIN: No rashes or lesions    Labs Reviewed  Spoke to patient in regards to abnormal labs.    CBC Full  -  ( 23 Oct 2018 09:40 )  WBC Count : 6.63 K/uL  Hemoglobin : 11.5 g/dL  Hematocrit : 35.1 %  Platelet Count - Automated : 192 K/uL  Mean Cell Volume : 92.4 fL  Mean Cell Hemoglobin : 30.3 pg  Mean Cell Hemoglobin Concentration : 32.8 g/dL  Auto Neutrophil # : x  Auto Lymphocyte # : x  Auto Monocyte # : x  Auto Eosinophil # : x  Auto Basophil # : x  Auto Neutrophil % : x  Auto Lymphocyte % : x  Auto Monocyte % : x  Auto Eosinophil % : x  Auto Basophil % : x    BMP:    10-23 @ 09:40    Blood Urea Nitrogen - 31  Calcium - 9.0  Carbond Dioxide - 26  Chloride - 106  Creatinine - 1.6  Glucose - 76  Potassium - 4.3  Sodium - 142      Hemoglobin A1c -   PT/INR - ( 22 Oct 2018 09:23 )   PT: 11.50 sec;   INR: 1.06 ratio         PTT - ( 22 Oct 2018 09:23 )  PTT:29.7 sec  Urine Culture:     MEDICATIONS  (STANDING):  allopurinol 100 milliGRAM(s) Oral daily  aspirin  chewable 81 milliGRAM(s) Oral daily  ATENolol  Tablet 25 milliGRAM(s) Oral daily  buDESOnide 160 MICROgram(s)/formoterol 4.5 MICROgram(s) Inhaler 2 Puff(s) Inhalation two times a day  chlorhexidine 4% Liquid 1 Application(s) Topical <User Schedule>  cholecalciferol 1000 Unit(s) Oral daily  cyanocobalamin 1000 MICROGram(s) Oral daily  enoxaparin Injectable 40 milliGRAM(s) SubCutaneous every 24 hours  ertapenem  IVPB 500 milliGRAM(s) IV Intermittent daily  folic acid 1 milliGRAM(s) Oral daily  hydroxychloroquine 200 milliGRAM(s) Oral two times a day  pantoprazole    Tablet 40 milliGRAM(s) Oral before breakfast  sodium chloride 0.9%. 1000 milliLiter(s) (75 mL/Hr) IV Continuous <Continuous>    MEDICATIONS  (PRN):  acetaminophen   Tablet .. 650 milliGRAM(s) Oral every 6 hours PRN Temp greater or equal to 38.5C (101.3F), Mild Pain (1 - 3), Moderate Pain (4 - 6), Severe Pain (7 - 10)

## 2018-10-23 NOTE — CONSULT NOTE ADULT - SUBJECTIVE AND OBJECTIVE BOX
WENDY TATE 73yMalePatient is a 73y old  Male who presents with a chief complaint of ESBL UTI (22 Oct 2018 12:36)      Patient has history of:  sulfa drugs (Hives)  sulfamethoxazole (Rash)    PMH - recurrent UTIs    FSH - not relevant    ROS - mild dysuria - resolved    Patient treated with:  hydroxychloroquine 200 milliGRAM(s) Oral two times a day  meropenem  IVPB 1000 milliGRAM(s) IV Intermittent every 8 hours    PHYSICAL EXAM  T(F): 96 (10-23-18 @ 05:20), Max: 97.4 (10-22-18 @ 17:35)  HR: 50 (10-23-18 @ 05:20) (50 - 60)  BP: 105/68 (10-23-18 @ 05:20) (82/51 - 129/80)  RR: 16 (10-23-18 @ 05:20) (16 - 18)  SpO2: 99% (10-22-18 @ 16:09) (98% - 99%)  Daily Height in cm: 180.34 (22 Oct 2018 17:35)    Daily Weight in k.2 (22 Oct 2018 13:51)    awake and alert   HEENT: normal, no nuchal rigidity  Cor: RSR Nl S1 S2  Lungs: clear  Decreased breath sounds at bases  Abdomen: Nontender, Nl BS,   Ext: No phlebitis     LAB & RADIOLOGIC RESULTS:                        12.0   8.33  )-----------( 207      ( 22 Oct 2018 09:23 )             36.7         10-22    141  |  104  |  36<H>  ----------------------------<  106<H>  4.2   |  28  |  1.6<H>    Ca    9.1      22 Oct 2018 15:12    TPro  7.3  /  Alb  4.5  /  TBili  0.3  /  DBili  x   /  AST  78<H>  /  ALT  26  /  AlkPhos  54  10-22    Sodium, Serum: 141 mmol/L (10-22-18 @ 15:12)  Blood Gas Venous - Sodium: 140 mmoL/L (10-22-18 @ 11:39)  Sodium, Serum: 141 mmol/L (10-22-18 @ 09:23)        Blood Gas Venous - Lactate: 1.2 mmoL/L (10-22-18 @ 11:39)    Lactic Acidosis     HCO3, Venous: 26 mmoL/L (10-22-18 @ 11:39)  pH, Venous: 7.34 (10-22-18 @ 11:39)     Creatinine, Serum: 1.6 mg/dL (10-22-18 @ 15:12)  eGFR if Non African American: 42 mL/min/1.73M2 (10-22-18 @ 15:12)  eGFR if : 48 mL/min/1.73M2 (10-22-18 @ 15:12)  Creatinine, Serum: 1.7 mg/dL (10-22-18 @ 09:23)  eGFR if Non African American: 39 mL/min/1.73M2 (10-22-18 @ 09:23)  eGFR if : 45 mL/min/1.73M2 (10-22-18 @ 09:23)    LIVER FUNCTIONS - ( 22 Oct 2018 09:23 )  Alb: 4.5 g/dL / Pro: 7.3 g/dL / ALK PHOS: 54 U/L / ALT: 26 U/L / AST: 78 U/L / GGT: x           PT/INR - ( 22 Oct 2018 09:23 )   PT: 11.50 sec;   INR: 1.06 ratio         PTT - ( 22 Oct 2018 09:23 )  PTT:29.7 sec  Patient Profile Adult [ARNOLDO Coleman] (10-22-18 @ 18:10)  ED ADULT Nurse Reassessment Note [SHAMAR Vieira] (10-22-18 @ 13:18)  Pharmacy Intervention Note [ARNOLDO Javier] (10-22-18 @ 13:05)  H&P Adult [ARNOLDO Travis] (10-22-18 @ 12:36)  Consult Note Adult-Urology PA [ARNOLDO Jolley] (10-22-18 @ 11:25)  Outpatient Clinical Summary - Medical  Group [O. Provider] (10-22-18 @ 11:25)  ED ADULT Triage Note [SHO Fontaine] (10-22-18 @ 08:54)  Outpatient Clinical Summary - Medical  Group [O. Provider] (10-22-18 @ 08:44)  PACU Discharge Note [ROJELIO Kothari] (18 @ 09:40)  ASU Discharge Plan (Adult/Pediatric) [KIMBERLY Jolley] (18 @ 09:36)  Brief Operative Note [CHARY Fernandes] (18 @ 09:32)  Pre-Anesthesia Evaluation Adult [ROJELIO Kothari] (18 @ 08:30)  ASU Patient Profile, Adult [WILLIE Talbert] (18 @ 07:36)  ASU Preop Checklist [WILLIE Segura] (18 @ 07:28)  H&P PST Adult [CHARY Orta] (18 @ 15:37)  ED ADULT Triage Note [SHO Mendosa] (18 @ 19:13)  Outpatient Clinical Summary - Medical  Group [O. Provider] (18 @ 18:45)    Urinalysis Basic - ( 22 Oct 2018 10:40 )    Color: Yellow / Appearance: Clear / S.025 / pH: x  Gluc: x / Ketone: Negative  / Bili: Negative / Urobili: 0.2 mg/dL   Blood: x / Protein: Negative mg/dL / Nitrite: Negative   Leuk Esterase: Small / RBC: 1-2 /HPF / WBC 10-25 /HPF   Sq Epi: x / Non Sq Epi: Few /HPF / Bacteria: Few

## 2018-10-24 ENCOUNTER — TRANSCRIPTION ENCOUNTER (OUTPATIENT)
Age: 73
End: 2018-10-24

## 2018-10-24 VITALS
TEMPERATURE: 98 F | DIASTOLIC BLOOD PRESSURE: 766 MMHG | SYSTOLIC BLOOD PRESSURE: 125 MMHG | RESPIRATION RATE: 16 BRPM | HEART RATE: 62 BPM

## 2018-10-24 LAB
-  AMIKACIN: SIGNIFICANT CHANGE UP
-  AMOXICILLIN/CLAVULANIC ACID: SIGNIFICANT CHANGE UP
-  AMPICILLIN/SULBACTAM: SIGNIFICANT CHANGE UP
-  AMPICILLIN: SIGNIFICANT CHANGE UP
-  AZTREONAM: SIGNIFICANT CHANGE UP
-  CEFAZOLIN: SIGNIFICANT CHANGE UP
-  CEFEPIME: SIGNIFICANT CHANGE UP
-  CEFOXITIN: SIGNIFICANT CHANGE UP
-  CEFTRIAXONE: SIGNIFICANT CHANGE UP
-  CIPROFLOXACIN: SIGNIFICANT CHANGE UP
-  ERTAPENEM: SIGNIFICANT CHANGE UP
-  GENTAMICIN: SIGNIFICANT CHANGE UP
-  IMIPENEM: SIGNIFICANT CHANGE UP
-  LEVOFLOXACIN: SIGNIFICANT CHANGE UP
-  MEROPENEM: SIGNIFICANT CHANGE UP
-  NITROFURANTOIN: SIGNIFICANT CHANGE UP
-  PIPERACILLIN/TAZOBACTAM: SIGNIFICANT CHANGE UP
-  TIGECYCLINE: SIGNIFICANT CHANGE UP
-  TOBRAMYCIN: SIGNIFICANT CHANGE UP
-  TRIMETHOPRIM/SULFAMETHOXAZOLE: SIGNIFICANT CHANGE UP
ALBUMIN SERPL ELPH-MCNC: 3.7 G/DL — SIGNIFICANT CHANGE UP (ref 3.5–5.2)
ALP SERPL-CCNC: 57 U/L — SIGNIFICANT CHANGE UP (ref 30–115)
ALT FLD-CCNC: 17 U/L — SIGNIFICANT CHANGE UP (ref 0–41)
ANION GAP SERPL CALC-SCNC: 11 MMOL/L — SIGNIFICANT CHANGE UP (ref 7–14)
AST SERPL-CCNC: 27 U/L — SIGNIFICANT CHANGE UP (ref 0–41)
BILIRUB SERPL-MCNC: 0.3 MG/DL — SIGNIFICANT CHANGE UP (ref 0.2–1.2)
BUN SERPL-MCNC: 32 MG/DL — HIGH (ref 10–20)
CALCIUM SERPL-MCNC: 8.9 MG/DL — SIGNIFICANT CHANGE UP (ref 8.5–10.1)
CHLORIDE SERPL-SCNC: 107 MMOL/L — SIGNIFICANT CHANGE UP (ref 98–110)
CO2 SERPL-SCNC: 26 MMOL/L — SIGNIFICANT CHANGE UP (ref 17–32)
CREAT SERPL-MCNC: 1.5 MG/DL — SIGNIFICANT CHANGE UP (ref 0.7–1.5)
CULTURE RESULTS: SIGNIFICANT CHANGE UP
GLUCOSE SERPL-MCNC: 80 MG/DL — SIGNIFICANT CHANGE UP (ref 70–99)
HCT VFR BLD CALC: 32.1 % — LOW (ref 42–52)
HGB BLD-MCNC: 10.5 G/DL — LOW (ref 14–18)
MCHC RBC-ENTMCNC: 30.3 PG — SIGNIFICANT CHANGE UP (ref 27–31)
MCHC RBC-ENTMCNC: 32.7 G/DL — SIGNIFICANT CHANGE UP (ref 32–37)
MCV RBC AUTO: 92.8 FL — SIGNIFICANT CHANGE UP (ref 80–94)
METHOD TYPE: SIGNIFICANT CHANGE UP
NRBC # BLD: 0 /100 WBCS — SIGNIFICANT CHANGE UP (ref 0–0)
ORGANISM # SPEC MICROSCOPIC CNT: SIGNIFICANT CHANGE UP
ORGANISM # SPEC MICROSCOPIC CNT: SIGNIFICANT CHANGE UP
PLATELET # BLD AUTO: 179 K/UL — SIGNIFICANT CHANGE UP (ref 130–400)
POTASSIUM SERPL-MCNC: 5 MMOL/L — SIGNIFICANT CHANGE UP (ref 3.5–5)
POTASSIUM SERPL-SCNC: 5 MMOL/L — SIGNIFICANT CHANGE UP (ref 3.5–5)
PROT SERPL-MCNC: 5.7 G/DL — LOW (ref 6–8)
RBC # BLD: 3.46 M/UL — LOW (ref 4.7–6.1)
RBC # FLD: 15.9 % — HIGH (ref 11.5–14.5)
SODIUM SERPL-SCNC: 144 MMOL/L — SIGNIFICANT CHANGE UP (ref 135–146)
SPECIMEN SOURCE: SIGNIFICANT CHANGE UP
WBC # BLD: 6.55 K/UL — SIGNIFICANT CHANGE UP (ref 4.8–10.8)
WBC # FLD AUTO: 6.55 K/UL — SIGNIFICANT CHANGE UP (ref 4.8–10.8)

## 2018-10-24 RX ORDER — ERTAPENEM SODIUM 1 G/1
1 INJECTION, POWDER, LYOPHILIZED, FOR SOLUTION INTRAMUSCULAR; INTRAVENOUS
Qty: 0 | Refills: 0 | COMMUNITY
Start: 2018-10-24

## 2018-10-24 RX ADMIN — PANTOPRAZOLE SODIUM 40 MILLIGRAM(S): 20 TABLET, DELAYED RELEASE ORAL at 06:23

## 2018-10-24 RX ADMIN — PREGABALIN 1000 MICROGRAM(S): 225 CAPSULE ORAL at 11:15

## 2018-10-24 RX ADMIN — BUDESONIDE AND FORMOTEROL FUMARATE DIHYDRATE 2 PUFF(S): 160; 4.5 AEROSOL RESPIRATORY (INHALATION) at 09:01

## 2018-10-24 RX ADMIN — Medication 1 MILLIGRAM(S): at 11:15

## 2018-10-24 RX ADMIN — CHLORHEXIDINE GLUCONATE 1 APPLICATION(S): 213 SOLUTION TOPICAL at 11:15

## 2018-10-24 RX ADMIN — Medication 81 MILLIGRAM(S): at 11:15

## 2018-10-24 RX ADMIN — Medication 100 MILLIGRAM(S): at 11:15

## 2018-10-24 RX ADMIN — Medication 1000 UNIT(S): at 11:15

## 2018-10-24 RX ADMIN — Medication 200 MILLIGRAM(S): at 05:43

## 2018-10-24 RX ADMIN — ERTAPENEM SODIUM 110 MILLIGRAM(S): 1 INJECTION, POWDER, LYOPHILIZED, FOR SOLUTION INTRAMUSCULAR; INTRAVENOUS at 11:15

## 2018-10-24 NOTE — PROCEDURE NOTE - NSPOSTPRCRAD_GEN_A_CORE
fluoroscopy/ultrasound/chest radiograph/line adjusted to depth of insertion/line in appropriate postion/postion of catheter/depth of insertion

## 2018-10-24 NOTE — DISCHARGE NOTE ADULT - MEDICATION SUMMARY - MEDICATIONS TO TAKE
I will START or STAY ON the medications listed below when I get home from the hospital:    vitamin b  -- 35021 microgram(s) by mouth once a day  -- Indication: For home med    Vitamin D2  -- 2500 unit(s) by mouth once a day  -- Indication: For [place] med    aspirin 81 mg oral tablet  -- 1 tab(s) by mouth once a day  -- Indication: For home med    allopurinol 100 mg oral tablet  -- 1 tab(s) by mouth once a day  -- Indication: For home med    Atacand HCT 16 mg-12.5 mg oral tablet  -- 1 tab(s) by mouth once a day  -- Indication: For home med    hydroxychloroquine 200 mg oral tablet  -- 1 tab(s) by mouth 2 times a day  -- Indication: For home med    atenolol 25 mg oral tablet  -- 1 tab(s) by mouth once a day  -- Indication: For home med    Advair Diskus 250 mcg-50 mcg inhalation powder  -- 1 puff(s) inhaled 2 times a day  -- Indication: For home med    ertapenem  -- 500 milligram(s) injectable once a day last dose 10/31  -- Indication: For Urinary tract infection without hematuria, site unspecified    phenazopyridine 100 mg oral tablet  -- 1 tab(s) by mouth 3 times a day   -- May discolor urine or feces.  Medication should be taken with plenty of water.  Take with food or milk.    -- Indication: For home med    lansoprazole 30 mg oral tablet, disintegrating  -- 1 tab(s) by mouth once a day  -- Indication: For home med    biotin 5000 mcg oral tablet, disintegrating  -- 1 tab(s) by mouth once a day  -- Indication: For home med    folic acid 0.8 mg oral tablet  -- 1 tab(s) by mouth once a day  -- Indication: For home med

## 2018-10-24 NOTE — PROGRESS NOTE ADULT - SUBJECTIVE AND OBJECTIVE BOX
Pt seen and examined at bedside. No CP or SOB. Pt feeling dizzy this am when came out of bed. Ortho stats neg and pt reassessed and pt denies further dizziness. BP controlled. Pt received PICC line today.     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  receives igg therapy  Pericarditis, unspecified chronicity, unspecified type: 2010-- resolved  Chronic obstructive pulmonary disease, unspecified COPD type  Sjogren's syndrome, with unspecified organ involvement  Cataract of both eyes, unspecified cataract type  Glaucoma of both eyes, unspecified glaucoma type  Cancer: bladder  GERD (gastroesophageal reflux disease)  Obstructive sleep apnea syndrome in adult: bi pap  HTN (hypertension)  History of tonsillectomy  H/O cystoscopy: turbt x 5      VITAL SIGNS (Last 24 hrs):  T(C): 36.8 (10-24-18 @ 14:00), Max: 36.8 (10-24-18 @ 14:00)  HR: 62 (10-24-18 @ 14:00) (55 - 80)  BP: 125/766 (10-24-18 @ 14:00) (104/54 - 128/79)  RR: 16 (10-24-18 @ 14:00) (16 - 16)  SpO2: 99% (10-23-18 @ 19:40) (99% - 99%)  Wt(kg): --  Daily     Daily     I&O's Summary      PHYSICAL EXAM:  GENERAL: NAD, well-developed  HEAD:  Atraumatic, Normocephalic  EYES: EOMI, PERRLA, conjunctiva and sclera clear  NECK: Supple, No JVD  CHEST/LUNG: Clear to auscultation bilaterally; No wheeze  HEART: Regular rate and rhythm; No murmurs, rubs, or gallops  ABDOMEN: Soft, Nontender, Nondistended; Bowel sounds present  EXTREMITIES:  2+ Peripheral Pulses, No clubbing, cyanosis, or edema  PSYCH: AAOx3  NEUROLOGY: non-focal  SKIN: No rashes or lesions    Labs Reviewed  Spoke to patient in regards to abnormal labs.    CBC Full  -  ( 24 Oct 2018 06:48 )  WBC Count : 6.55 K/uL  Hemoglobin : 10.5 g/dL  Hematocrit : 32.1 %  Platelet Count - Automated : 179 K/uL  Mean Cell Volume : 92.8 fL  Mean Cell Hemoglobin : 30.3 pg  Mean Cell Hemoglobin Concentration : 32.7 g/dL  Auto Neutrophil # : x  Auto Lymphocyte # : x  Auto Monocyte # : x  Auto Eosinophil # : x  Auto Basophil # : x  Auto Neutrophil % : x  Auto Lymphocyte % : x  Auto Monocyte % : x  Auto Eosinophil % : x  Auto Basophil % : x    BMP:    10-24 @ 06:48    Blood Urea Nitrogen - 32  Calcium - 8.9  Carbond Dioxide - 26  Chloride - 107  Creatinine - 1.5  Glucose - 80  Potassium - 5.0  Sodium - 144      Hemoglobin A1c -   PT/INR - ( 22 Oct 2018 09:23 )   PT: 11.50 sec;   INR: 1.06 ratio         PTT - ( 22 Oct 2018 09:23 )  PTT:29.7 sec  Urine Culture:  10-22 @ 10:45 Urine culture: --    Culture Results:   No growth to date.  Method Type: --  Organism: --  Organism Identification: --  Specimen Source: .Blood Blood  10-22 @ 10:40 Urine culture: --    Culture Results:   10,000 - 49,000 CFU/mL Escherichia coli  Method Type: --  Organism: --  Organism Identification: --  Specimen Source: .Urine Clean Catch (Midstream)        Imaging reviewed      MEDICATIONS  (STANDING):  allopurinol 100 milliGRAM(s) Oral daily  aspirin  chewable 81 milliGRAM(s) Oral daily  buDESOnide 160 MICROgram(s)/formoterol 4.5 MICROgram(s) Inhaler 2 Puff(s) Inhalation two times a day  chlorhexidine 4% Liquid 1 Application(s) Topical <User Schedule>  cholecalciferol 1000 Unit(s) Oral daily  cyanocobalamin 1000 MICROGram(s) Oral daily  enoxaparin Injectable 40 milliGRAM(s) SubCutaneous every 24 hours  ertapenem  IVPB 500 milliGRAM(s) IV Intermittent daily  folic acid 1 milliGRAM(s) Oral daily  hydroxychloroquine 200 milliGRAM(s) Oral two times a day  pantoprazole    Tablet 40 milliGRAM(s) Oral before breakfast    MEDICATIONS  (PRN):  acetaminophen   Tablet .. 650 milliGRAM(s) Oral every 6 hours PRN Temp greater or equal to 38.5C (101.3F), Mild Pain (1 - 3), Moderate Pain (4 - 6), Severe Pain (7 - 10)

## 2018-10-24 NOTE — DISCHARGE NOTE ADULT - HOSPITAL COURSE
72 y/o M with PMH HTN, HLD, COPD not on home O2, current bladder cancer (s/p surgery, chemo, scheduled for cystoscopy in 2 wks to target further care), Sjogren's presents for complicated MDR UTI. Patient was treated for recurrent UTIs with multiple antibiotics. Most recently, his culture grew ESBL E. coli for which Dr. Fernandes advised him to present to the ED for IV antibiotics.    Pt admitted to med service. Pt was in initially placed on Meropenum. ID saw the patient and recommended ertapenum 500 mg daily for total 10 days. Pt received PICC line inpt. Pt to be DCed home with VNA for IV Abx. Last dose 10/31.

## 2018-10-24 NOTE — DISCHARGE NOTE ADULT - PATIENT PORTAL LINK FT
You can access the [facility] Patient [facility], offered by [facility], by registering with the following website: [email or web address]

## 2018-10-24 NOTE — PROCEDURE NOTE - ADDITIONAL PROCEDURE DETAILS
5-Chinese, 42.5cm single lumen PICC enters from the left upper extremity, with tip in the superior vena cava near the caval-atrial junction.  The catheter works well and is ready to use.

## 2018-10-24 NOTE — DISCHARGE NOTE ADULT - CARE PLAN
Principal Discharge DX:	Urinary tract infection without hematuria, site unspecified  Goal:	abx  Assessment and plan of treatment:	continue ertapenem 500 mg daily last dose 10/31

## 2018-10-24 NOTE — PROGRESS NOTE ADULT - ASSESSMENT
74 y/o M with PMH HTN, HLD, COPD not on home O2, current bladder cancer (s/p surgery, chemo, scheduled for cystoscopy in 2 wks to target further care), Sjogren's presents for complicated MDR UTI. Patient was treated for recurrent UTIs with multiple antibiotics. Most recently, his culture grew ESBL E. coli for which Dr. Fernandes advised him to present to the ED for IV antibiotics.    Pt admitted to med service. Pt was in initially placed on Meropenum. ID saw the patient and recommended ertapenem 500 mg daily for total 10 days. Pt received PICC line inpt. Pt to be DCed home with VNA for IV Abx. Last dose 10/31.  Pt medically cleared fro DC.

## 2018-10-24 NOTE — PROCEDURE NOTE - NSPROCDETAILS_GEN_ALL_CORE
ultrasound assessment/location identified, draped/prepped, sterile technique used/sterile dressing applied/supine position/ultrasound guidance/sterile technique, catheter placed

## 2018-10-24 NOTE — PROCEDURE NOTE - NSPOSTCAREGUIDE_GEN_A_CORE
Keep the cast/splint/dressing clean and dry/Verbal/written post procedure instructions were given to patient/caregiver/Instructed patient/caregiver to follow-up with primary care physician/Instructed patient/caregiver regarding signs and symptoms of infection/Care for catheter as per unit/ICU protocols

## 2018-10-28 LAB
CULTURE RESULTS: SIGNIFICANT CHANGE UP
CULTURE RESULTS: SIGNIFICANT CHANGE UP
SPECIMEN SOURCE: SIGNIFICANT CHANGE UP
SPECIMEN SOURCE: SIGNIFICANT CHANGE UP

## 2018-10-30 DIAGNOSIS — H26.9 UNSPECIFIED CATARACT: ICD-10-CM

## 2018-10-30 DIAGNOSIS — Z79.82 LONG TERM (CURRENT) USE OF ASPIRIN: ICD-10-CM

## 2018-10-30 DIAGNOSIS — E78.5 HYPERLIPIDEMIA, UNSPECIFIED: ICD-10-CM

## 2018-10-30 DIAGNOSIS — Z79.51 LONG TERM (CURRENT) USE OF INHALED STEROIDS: ICD-10-CM

## 2018-10-30 DIAGNOSIS — D70.9 NEUTROPENIA, UNSPECIFIED: ICD-10-CM

## 2018-10-30 DIAGNOSIS — C67.9 MALIGNANT NEOPLASM OF BLADDER, UNSPECIFIED: ICD-10-CM

## 2018-10-30 DIAGNOSIS — K21.9 GASTRO-ESOPHAGEAL REFLUX DISEASE WITHOUT ESOPHAGITIS: ICD-10-CM

## 2018-10-30 DIAGNOSIS — G47.33 OBSTRUCTIVE SLEEP APNEA (ADULT) (PEDIATRIC): ICD-10-CM

## 2018-10-30 DIAGNOSIS — J44.9 CHRONIC OBSTRUCTIVE PULMONARY DISEASE, UNSPECIFIED: ICD-10-CM

## 2018-10-30 DIAGNOSIS — I12.9 HYPERTENSIVE CHRONIC KIDNEY DISEASE WITH STAGE 1 THROUGH STAGE 4 CHRONIC KIDNEY DISEASE, OR UNSPECIFIED CHRONIC KIDNEY DISEASE: ICD-10-CM

## 2018-10-30 DIAGNOSIS — N18.3 CHRONIC KIDNEY DISEASE, STAGE 3 (MODERATE): ICD-10-CM

## 2018-10-30 DIAGNOSIS — M35.00 SJOGREN SYNDROME, UNSPECIFIED: ICD-10-CM

## 2018-10-30 DIAGNOSIS — B96.20 UNSPECIFIED ESCHERICHIA COLI [E. COLI] AS THE CAUSE OF DISEASES CLASSIFIED ELSEWHERE: ICD-10-CM

## 2018-10-30 DIAGNOSIS — N39.0 URINARY TRACT INFECTION, SITE NOT SPECIFIED: ICD-10-CM

## 2018-10-30 DIAGNOSIS — Z16.30 RESISTANCE TO UNSPECIFIED ANTIMICROBIAL DRUGS: ICD-10-CM

## 2018-10-30 DIAGNOSIS — E87.5 HYPERKALEMIA: ICD-10-CM

## 2018-11-16 ENCOUNTER — APPOINTMENT (OUTPATIENT)
Dept: UROLOGY | Facility: CLINIC | Age: 73
End: 2018-11-16
Payer: MEDICARE

## 2018-11-16 ENCOUNTER — OUTPATIENT (OUTPATIENT)
Dept: OUTPATIENT SERVICES | Facility: HOSPITAL | Age: 73
LOS: 1 days | Discharge: HOME | End: 2018-11-16

## 2018-11-16 DIAGNOSIS — Z98.890 OTHER SPECIFIED POSTPROCEDURAL STATES: Chronic | ICD-10-CM

## 2018-11-16 LAB
BILIRUB UR QL STRIP: NORMAL
CLARITY UR: CLEAR
COLLECTION METHOD: NORMAL
GLUCOSE UR-MCNC: NORMAL
HCG UR QL: NORMAL EU/DL
HGB UR QL STRIP.AUTO: NORMAL
KETONES UR-MCNC: NORMAL
LEUKOCYTE ESTERASE UR QL STRIP: 75
NITRITE UR QL STRIP: NORMAL
PH UR STRIP: 5
PROT UR STRIP-MCNC: NORMAL
SP GR UR STRIP: 1.01

## 2018-11-16 PROCEDURE — 81003 URINALYSIS AUTO W/O SCOPE: CPT | Mod: QW

## 2018-11-16 PROCEDURE — 52000 CYSTOURETHROSCOPY: CPT

## 2018-11-17 DIAGNOSIS — C67.9 MALIGNANT NEOPLASM OF BLADDER, UNSPECIFIED: ICD-10-CM

## 2018-11-17 DIAGNOSIS — N39.0 URINARY TRACT INFECTION, SITE NOT SPECIFIED: ICD-10-CM

## 2018-11-20 LAB — BACTERIA UR CULT: ABNORMAL

## 2018-11-23 ENCOUNTER — OUTPATIENT (OUTPATIENT)
Dept: OUTPATIENT SERVICES | Facility: HOSPITAL | Age: 73
LOS: 1 days | Discharge: HOME | End: 2018-11-23

## 2018-11-23 VITALS
DIASTOLIC BLOOD PRESSURE: 78 MMHG | SYSTOLIC BLOOD PRESSURE: 145 MMHG | OXYGEN SATURATION: 98 % | RESPIRATION RATE: 16 BRPM | HEIGHT: 70 IN | WEIGHT: 230.38 LBS | HEART RATE: 60 BPM | TEMPERATURE: 97 F

## 2018-11-23 DIAGNOSIS — Z98.890 OTHER SPECIFIED POSTPROCEDURAL STATES: Chronic | ICD-10-CM

## 2018-11-23 DIAGNOSIS — Z01.818 ENCOUNTER FOR OTHER PREPROCEDURAL EXAMINATION: ICD-10-CM

## 2018-11-23 DIAGNOSIS — C67.9 MALIGNANT NEOPLASM OF BLADDER, UNSPECIFIED: ICD-10-CM

## 2018-11-23 LAB
ALBUMIN SERPL ELPH-MCNC: 4.2 G/DL — SIGNIFICANT CHANGE UP (ref 3.5–5.2)
ALP SERPL-CCNC: 78 U/L — SIGNIFICANT CHANGE UP (ref 30–115)
ALT FLD-CCNC: 21 U/L — SIGNIFICANT CHANGE UP (ref 0–41)
ANION GAP SERPL CALC-SCNC: 15 MMOL/L — HIGH (ref 7–14)
APPEARANCE UR: CLEAR — SIGNIFICANT CHANGE UP
APTT BLD: 31.4 SEC — SIGNIFICANT CHANGE UP (ref 27–39.2)
AST SERPL-CCNC: 27 U/L — SIGNIFICANT CHANGE UP (ref 0–41)
BASOPHILS # BLD AUTO: 0.08 K/UL — SIGNIFICANT CHANGE UP (ref 0–0.2)
BASOPHILS NFR BLD AUTO: 1 % — SIGNIFICANT CHANGE UP (ref 0–1)
BILIRUB SERPL-MCNC: 0.2 MG/DL — SIGNIFICANT CHANGE UP (ref 0.2–1.2)
BILIRUB UR-MCNC: NEGATIVE — SIGNIFICANT CHANGE UP
BUN SERPL-MCNC: 33 MG/DL — HIGH (ref 10–20)
CALCIUM SERPL-MCNC: 9.3 MG/DL — SIGNIFICANT CHANGE UP (ref 8.5–10.1)
CHLORIDE SERPL-SCNC: 103 MMOL/L — SIGNIFICANT CHANGE UP (ref 98–110)
CO2 SERPL-SCNC: 24 MMOL/L — SIGNIFICANT CHANGE UP (ref 17–32)
COLOR SPEC: YELLOW — SIGNIFICANT CHANGE UP
CREAT SERPL-MCNC: 1.4 MG/DL — SIGNIFICANT CHANGE UP (ref 0.7–1.5)
DIFF PNL FLD: NEGATIVE — SIGNIFICANT CHANGE UP
EOSINOPHIL # BLD AUTO: 0.4 K/UL — SIGNIFICANT CHANGE UP (ref 0–0.7)
EOSINOPHIL NFR BLD AUTO: 5.1 % — SIGNIFICANT CHANGE UP (ref 0–8)
GLUCOSE SERPL-MCNC: 81 MG/DL — SIGNIFICANT CHANGE UP (ref 70–99)
GLUCOSE UR QL: NEGATIVE MG/DL — SIGNIFICANT CHANGE UP
HCT VFR BLD CALC: 35.2 % — LOW (ref 42–52)
HGB BLD-MCNC: 11.6 G/DL — LOW (ref 14–18)
IMM GRANULOCYTES NFR BLD AUTO: 0.3 % — SIGNIFICANT CHANGE UP (ref 0.1–0.3)
INR BLD: 0.98 RATIO — SIGNIFICANT CHANGE UP (ref 0.65–1.3)
KETONES UR-MCNC: NEGATIVE — SIGNIFICANT CHANGE UP
LEUKOCYTE ESTERASE UR-ACNC: ABNORMAL
LYMPHOCYTES # BLD AUTO: 2.83 K/UL — SIGNIFICANT CHANGE UP (ref 1.2–3.4)
LYMPHOCYTES # BLD AUTO: 36.1 % — SIGNIFICANT CHANGE UP (ref 20.5–51.1)
MCHC RBC-ENTMCNC: 30.1 PG — SIGNIFICANT CHANGE UP (ref 27–31)
MCHC RBC-ENTMCNC: 33 G/DL — SIGNIFICANT CHANGE UP (ref 32–37)
MCV RBC AUTO: 91.2 FL — SIGNIFICANT CHANGE UP (ref 80–94)
MONOCYTES # BLD AUTO: 0.65 K/UL — HIGH (ref 0.1–0.6)
MONOCYTES NFR BLD AUTO: 8.3 % — SIGNIFICANT CHANGE UP (ref 1.7–9.3)
NEUTROPHILS # BLD AUTO: 3.86 K/UL — SIGNIFICANT CHANGE UP (ref 1.4–6.5)
NEUTROPHILS NFR BLD AUTO: 49.2 % — SIGNIFICANT CHANGE UP (ref 42.2–75.2)
NITRITE UR-MCNC: NEGATIVE — SIGNIFICANT CHANGE UP
NRBC # BLD: 0 /100 WBCS — SIGNIFICANT CHANGE UP (ref 0–0)
PH UR: 6 — SIGNIFICANT CHANGE UP (ref 5–8)
PLATELET # BLD AUTO: 191 K/UL — SIGNIFICANT CHANGE UP (ref 130–400)
POTASSIUM SERPL-MCNC: 4.9 MMOL/L — SIGNIFICANT CHANGE UP (ref 3.5–5)
POTASSIUM SERPL-SCNC: 4.9 MMOL/L — SIGNIFICANT CHANGE UP (ref 3.5–5)
PROT SERPL-MCNC: 6.9 G/DL — SIGNIFICANT CHANGE UP (ref 6–8)
PROT UR-MCNC: NEGATIVE MG/DL — SIGNIFICANT CHANGE UP
PROTHROM AB SERPL-ACNC: 11.3 SEC — SIGNIFICANT CHANGE UP (ref 9.95–12.87)
RBC # BLD: 3.86 M/UL — LOW (ref 4.7–6.1)
RBC # FLD: 15.6 % — HIGH (ref 11.5–14.5)
SODIUM SERPL-SCNC: 142 MMOL/L — SIGNIFICANT CHANGE UP (ref 135–146)
SP GR SPEC: 1.02 — SIGNIFICANT CHANGE UP (ref 1.01–1.03)
UROBILINOGEN FLD QL: 0.2 MG/DL — SIGNIFICANT CHANGE UP (ref 0.2–0.2)
WBC # BLD: 7.84 K/UL — SIGNIFICANT CHANGE UP (ref 4.8–10.8)
WBC # FLD AUTO: 7.84 K/UL — SIGNIFICANT CHANGE UP (ref 4.8–10.8)
WBC UR QL: SIGNIFICANT CHANGE UP /HPF

## 2018-11-23 RX ORDER — LANSOPRAZOLE 15 MG/1
1 CAPSULE, DELAYED RELEASE ORAL
Qty: 0 | Refills: 0 | COMMUNITY

## 2018-11-23 RX ORDER — ERTAPENEM SODIUM 1 G/1
500 INJECTION, POWDER, LYOPHILIZED, FOR SOLUTION INTRAMUSCULAR; INTRAVENOUS
Qty: 0 | Refills: 0 | COMMUNITY

## 2018-11-23 RX ORDER — ATENOLOL 25 MG/1
1 TABLET ORAL
Qty: 0 | Refills: 0 | COMMUNITY

## 2018-11-23 NOTE — H&P PST ADULT - HISTORY OF PRESENT ILLNESS
"I HAVE POLYPS IN MY BLADDER"  PT CURRENTLY DENIES CHEST PAIN, PALPITATIONS, DYSURIA  ADMITTED IN OCTOBER HAD DRUG RESISTANT UTI   WAS PLACED ON MEDS AT PT WILL BE ESCORTED HOME DOP BY WITH PICC LINE FOR ANTIBIOTICS FOR TEN DAYS  URI AT PRESENT SORE THROAT AND MUCOUS TAKING DAYQUIL AND ECCHENACIA   WILL INFORM SURGEON IF URI DOES NOT RESOLVE  EXERCISE TOLERANCE 5-6 BLOCKS  PT DENIES ANY RASHES, ABRASION, OR OPEN WOUNDS OR LACERATIONS    AS PER THE PT, THIS IS A COMPLETE MEDICAL AND SURGICAL HX, INCLUDING MEDICATIONS PRESCRIBED AND OVER THE COUNTER

## 2018-11-23 NOTE — H&P PST ADULT - ATTENDING COMMENTS
For cyst, biopsy and fulgaration of bladder tumor.  risks, benefits, alternatives discussed including uti, sepsis, bleeding, possible catheter

## 2018-11-24 LAB
CULTURE RESULTS: NO GROWTH — SIGNIFICANT CHANGE UP
SPECIMEN SOURCE: SIGNIFICANT CHANGE UP

## 2018-11-26 DIAGNOSIS — C80.1 MALIGNANT (PRIMARY) NEOPLASM, UNSPECIFIED: ICD-10-CM

## 2018-11-26 DIAGNOSIS — Z87.891 PERSONAL HISTORY OF NICOTINE DEPENDENCE: ICD-10-CM

## 2018-11-26 DIAGNOSIS — E66.9 OBESITY, UNSPECIFIED: ICD-10-CM

## 2018-11-26 DIAGNOSIS — H40.9 UNSPECIFIED GLAUCOMA: ICD-10-CM

## 2018-11-26 DIAGNOSIS — G47.30 SLEEP APNEA, UNSPECIFIED: ICD-10-CM

## 2018-11-26 DIAGNOSIS — J44.9 CHRONIC OBSTRUCTIVE PULMONARY DISEASE, UNSPECIFIED: ICD-10-CM

## 2018-12-06 ENCOUNTER — APPOINTMENT (OUTPATIENT)
Dept: UROLOGY | Facility: HOSPITAL | Age: 73
End: 2018-12-06
Payer: MEDICARE

## 2018-12-06 ENCOUNTER — OUTPATIENT (OUTPATIENT)
Dept: OUTPATIENT SERVICES | Facility: HOSPITAL | Age: 73
LOS: 1 days | Discharge: HOME | End: 2018-12-06

## 2018-12-06 VITALS — SYSTOLIC BLOOD PRESSURE: 133 MMHG | RESPIRATION RATE: 17 BRPM | DIASTOLIC BLOOD PRESSURE: 68 MMHG | HEART RATE: 55 BPM

## 2018-12-06 VITALS
HEIGHT: 70 IN | SYSTOLIC BLOOD PRESSURE: 127 MMHG | HEART RATE: 51 BPM | RESPIRATION RATE: 17 BRPM | DIASTOLIC BLOOD PRESSURE: 62 MMHG | WEIGHT: 230.38 LBS | OXYGEN SATURATION: 98 % | TEMPERATURE: 97 F

## 2018-12-06 DIAGNOSIS — Z98.890 OTHER SPECIFIED POSTPROCEDURAL STATES: Chronic | ICD-10-CM

## 2018-12-06 PROCEDURE — 52214 CYSTOSCOPY AND TREATMENT: CPT

## 2018-12-06 RX ORDER — SODIUM CHLORIDE 9 MG/ML
1000 INJECTION, SOLUTION INTRAVENOUS
Qty: 0 | Refills: 0 | Status: DISCONTINUED | OUTPATIENT
Start: 2018-12-06 | End: 2018-12-21

## 2018-12-06 RX ORDER — OXYCODONE AND ACETAMINOPHEN 5; 325 MG/1; MG/1
2 TABLET ORAL ONCE
Qty: 0 | Refills: 0 | Status: DISCONTINUED | OUTPATIENT
Start: 2018-12-06 | End: 2018-12-06

## 2018-12-06 RX ORDER — NITROFURANTOIN MACROCRYSTAL 50 MG
1 CAPSULE ORAL
Qty: 10 | Refills: 0
Start: 2018-12-06 | End: 2018-12-10

## 2018-12-06 RX ORDER — ONDANSETRON 8 MG/1
4 TABLET, FILM COATED ORAL ONCE
Qty: 0 | Refills: 0 | Status: DISCONTINUED | OUTPATIENT
Start: 2018-12-06 | End: 2018-12-21

## 2018-12-06 RX ORDER — MORPHINE SULFATE 50 MG/1
2 CAPSULE, EXTENDED RELEASE ORAL
Qty: 0 | Refills: 0 | Status: DISCONTINUED | OUTPATIENT
Start: 2018-12-06 | End: 2018-12-06

## 2018-12-06 RX ORDER — PHENAZOPYRIDINE HCL 100 MG
200 TABLET ORAL ONCE
Qty: 0 | Refills: 0 | Status: COMPLETED | OUTPATIENT
Start: 2018-12-06 | End: 2018-12-06

## 2018-12-06 RX ORDER — OXYCODONE AND ACETAMINOPHEN 5; 325 MG/1; MG/1
1 TABLET ORAL ONCE
Qty: 0 | Refills: 0 | Status: DISCONTINUED | OUTPATIENT
Start: 2018-12-06 | End: 2018-12-06

## 2018-12-06 RX ORDER — PHENAZOPYRIDINE HCL 100 MG
1 TABLET ORAL
Qty: 21 | Refills: 0
Start: 2018-12-06 | End: 2018-12-12

## 2018-12-06 RX ADMIN — Medication 200 MILLIGRAM(S): at 12:08

## 2018-12-06 RX ADMIN — SODIUM CHLORIDE 100 MILLILITER(S): 9 INJECTION, SOLUTION INTRAVENOUS at 12:03

## 2018-12-06 NOTE — BRIEF OPERATIVE NOTE - PROCEDURE
<<-----Click on this checkbox to enter Procedure Cystoscopic biopsy with fulguration of neoplasm of bladder  12/06/2018    Active  ARABOY

## 2018-12-11 DIAGNOSIS — D49.4 NEOPLASM OF UNSPECIFIED BEHAVIOR OF BLADDER: ICD-10-CM

## 2018-12-11 DIAGNOSIS — J44.9 CHRONIC OBSTRUCTIVE PULMONARY DISEASE, UNSPECIFIED: ICD-10-CM

## 2018-12-11 DIAGNOSIS — I10 ESSENTIAL (PRIMARY) HYPERTENSION: ICD-10-CM

## 2018-12-11 DIAGNOSIS — M35.00 SJOGREN SYNDROME, UNSPECIFIED: ICD-10-CM

## 2018-12-11 DIAGNOSIS — Z88.2 ALLERGY STATUS TO SULFONAMIDES: ICD-10-CM

## 2018-12-11 DIAGNOSIS — G47.33 OBSTRUCTIVE SLEEP APNEA (ADULT) (PEDIATRIC): ICD-10-CM

## 2018-12-20 NOTE — ASU PREOP CHECKLIST - DENTURES
Take 1 tablet by mouth every morning (before breakfast)       ALLERGIES     Codeine; Ultram [tramadol hcl]; and Vicodin [hydrocodone-acetaminophen]    FAMILY HISTORY     History reviewed. No pertinent family history. No family status information on file. SOCIAL HISTORY    reports that he has been smoking Cigarettes. He has a 14.00 pack-year smoking history. He has never used smokeless tobacco. He reports that he does not drink alcohol or use drugs. PHYSICAL EXAM    (up to 7 for level 4, 8 or more for level 5)     ED Triage Vitals [12/20/18 1232]   BP Temp Temp Source Pulse Resp SpO2 Height Weight   (!) 140/81 97.9 °F (36.6 °C) Temporal 71 15 98 % -- 224 lb 13.9 oz (102 kg)       Physical Exam   Constitutional: He is oriented to person, place, and time. He appears well-developed and well-nourished. No distress. HENT:   Head: Normocephalic and atraumatic. Right Ear: There is drainage, swelling and tenderness. Tympanic membrane is erythematous and bulging. Neck: Neck supple. Pulmonary/Chest: Effort normal. No respiratory distress. Musculoskeletal: Normal range of motion. Neurological: He is alert and oriented to person, place, and time. Skin: Skin is warm and dry. Psychiatric: He has a normal mood and affect. His behavior is normal.   Nursing note and vitals reviewed. EMERGENCY DEPARTMENT COURSE and DIFFERENTIAL DIAGNOSIS/MDM:   Vitals:    Vitals:    12/20/18 1232   BP: (!) 140/81   Pulse: 71   Resp: 15   Temp: 97.9 °F (36.6 °C)   TempSrc: Temporal   SpO2: 98%   Weight: 224 lb 13.9 oz (102 kg)        I have evaluated this patient. My supervising physician was available for consultation. The patient is nontoxic and afebrile. Ear exam consistent with both internal and external otitis. He was placed on Ciprodex and Amoxicillin and asked to use ibuprofen for pain. Close follow up with PCP in 3-5 days. Discussed results, diagnosis and plan with patient and/or family.  Questions no

## 2018-12-21 ENCOUNTER — APPOINTMENT (OUTPATIENT)
Dept: UROLOGY | Facility: CLINIC | Age: 73
End: 2018-12-21
Payer: MEDICARE

## 2018-12-21 VITALS
WEIGHT: 220 LBS | SYSTOLIC BLOOD PRESSURE: 101 MMHG | HEIGHT: 71 IN | DIASTOLIC BLOOD PRESSURE: 65 MMHG | HEART RATE: 59 BPM | BODY MASS INDEX: 30.8 KG/M2

## 2018-12-21 PROCEDURE — 99213 OFFICE O/P EST LOW 20 MIN: CPT

## 2019-03-27 ENCOUNTER — APPOINTMENT (OUTPATIENT)
Dept: UROLOGY | Facility: CLINIC | Age: 74
End: 2019-03-27
Payer: COMMERCIAL

## 2019-03-27 VITALS — BODY MASS INDEX: 30.8 KG/M2 | WEIGHT: 220 LBS | HEIGHT: 71 IN

## 2019-03-27 PROCEDURE — 52000 CYSTOURETHROSCOPY: CPT

## 2019-06-28 ENCOUNTER — APPOINTMENT (OUTPATIENT)
Dept: UROLOGY | Facility: CLINIC | Age: 74
End: 2019-06-28
Payer: COMMERCIAL

## 2019-06-28 VITALS
WEIGHT: 225 LBS | BODY MASS INDEX: 31.5 KG/M2 | SYSTOLIC BLOOD PRESSURE: 119 MMHG | HEIGHT: 71 IN | HEART RATE: 60 BPM | DIASTOLIC BLOOD PRESSURE: 77 MMHG

## 2019-06-28 LAB
BILIRUB UR QL STRIP: NORMAL
CLARITY UR: NORMAL
COLLECTION METHOD: NORMAL
GLUCOSE UR-MCNC: NORMAL
HCG UR QL: NORMAL EU/DL
HGB UR QL STRIP.AUTO: NORMAL
KETONES UR-MCNC: NORMAL
LEUKOCYTE ESTERASE UR QL STRIP: 75
NITRITE UR QL STRIP: NORMAL
PH UR STRIP: 5
PROT UR STRIP-MCNC: NORMAL
SP GR UR STRIP: 1.01

## 2019-06-28 PROCEDURE — 52000 CYSTOURETHROSCOPY: CPT

## 2019-06-28 PROCEDURE — 81003 URINALYSIS AUTO W/O SCOPE: CPT | Mod: QW

## 2019-10-01 ENCOUNTER — APPOINTMENT (OUTPATIENT)
Dept: UROLOGY | Facility: CLINIC | Age: 74
End: 2019-10-01
Payer: MEDICARE

## 2019-10-01 ENCOUNTER — APPOINTMENT (OUTPATIENT)
Dept: UROLOGY | Facility: CLINIC | Age: 74
End: 2019-10-01

## 2019-10-01 VITALS — WEIGHT: 225 LBS | HEIGHT: 71 IN | BODY MASS INDEX: 31.5 KG/M2

## 2019-10-01 LAB
BILIRUB UR QL STRIP: NORMAL
CLARITY UR: CLEAR
COLLECTION METHOD: NORMAL
GLUCOSE UR-MCNC: NORMAL
HCG UR QL: NORMAL EU/DL
HGB UR QL STRIP.AUTO: NORMAL
KETONES UR-MCNC: NORMAL
LEUKOCYTE ESTERASE UR QL STRIP: 25
NITRITE UR QL STRIP: NORMAL
PH UR STRIP: 5
PROT UR STRIP-MCNC: NORMAL
SP GR UR STRIP: 1.01

## 2019-10-01 PROCEDURE — 52000 CYSTOURETHROSCOPY: CPT

## 2019-10-01 PROCEDURE — 81003 URINALYSIS AUTO W/O SCOPE: CPT | Mod: QW

## 2019-10-21 ENCOUNTER — FORM ENCOUNTER (OUTPATIENT)
Age: 74
End: 2019-10-21

## 2019-10-22 ENCOUNTER — OUTPATIENT (OUTPATIENT)
Dept: OUTPATIENT SERVICES | Facility: HOSPITAL | Age: 74
LOS: 1 days | Discharge: HOME | End: 2019-10-22
Payer: MEDICARE

## 2019-10-22 VITALS
RESPIRATION RATE: 17 BRPM | TEMPERATURE: 97 F | HEIGHT: 70 IN | DIASTOLIC BLOOD PRESSURE: 68 MMHG | WEIGHT: 238.1 LBS | HEART RATE: 58 BPM | SYSTOLIC BLOOD PRESSURE: 141 MMHG | OXYGEN SATURATION: 97 %

## 2019-10-22 DIAGNOSIS — Z98.890 OTHER SPECIFIED POSTPROCEDURAL STATES: Chronic | ICD-10-CM

## 2019-10-22 DIAGNOSIS — Z01.818 ENCOUNTER FOR OTHER PREPROCEDURAL EXAMINATION: ICD-10-CM

## 2019-10-22 DIAGNOSIS — C67.9 MALIGNANT NEOPLASM OF BLADDER, UNSPECIFIED: ICD-10-CM

## 2019-10-22 LAB
ALBUMIN SERPL ELPH-MCNC: 4.7 G/DL — SIGNIFICANT CHANGE UP (ref 3.5–5.2)
ALP SERPL-CCNC: 60 U/L — SIGNIFICANT CHANGE UP (ref 30–115)
ALT FLD-CCNC: 19 U/L — SIGNIFICANT CHANGE UP (ref 0–41)
ANION GAP SERPL CALC-SCNC: 14 MMOL/L — SIGNIFICANT CHANGE UP (ref 7–14)
APPEARANCE UR: CLEAR — SIGNIFICANT CHANGE UP
APTT BLD: 33.8 SEC — SIGNIFICANT CHANGE UP (ref 27–39.2)
AST SERPL-CCNC: 32 U/L — SIGNIFICANT CHANGE UP (ref 0–41)
BILIRUB SERPL-MCNC: 0.3 MG/DL — SIGNIFICANT CHANGE UP (ref 0.2–1.2)
BILIRUB UR-MCNC: NEGATIVE — SIGNIFICANT CHANGE UP
BUN SERPL-MCNC: 34 MG/DL — HIGH (ref 10–20)
CALCIUM SERPL-MCNC: 10 MG/DL — SIGNIFICANT CHANGE UP (ref 8.5–10.1)
CHLORIDE SERPL-SCNC: 101 MMOL/L — SIGNIFICANT CHANGE UP (ref 98–110)
CO2 SERPL-SCNC: 24 MMOL/L — SIGNIFICANT CHANGE UP (ref 17–32)
COLOR SPEC: SIGNIFICANT CHANGE UP
CREAT SERPL-MCNC: 1.4 MG/DL — SIGNIFICANT CHANGE UP (ref 0.7–1.5)
DIFF PNL FLD: NEGATIVE — SIGNIFICANT CHANGE UP
GLUCOSE SERPL-MCNC: 85 MG/DL — SIGNIFICANT CHANGE UP (ref 70–99)
GLUCOSE UR QL: NEGATIVE — SIGNIFICANT CHANGE UP
HCT VFR BLD CALC: 39.4 % — LOW (ref 42–52)
HGB BLD-MCNC: 12.8 G/DL — LOW (ref 14–18)
INR BLD: 0.99 RATIO — SIGNIFICANT CHANGE UP (ref 0.65–1.3)
KETONES UR-MCNC: NEGATIVE — SIGNIFICANT CHANGE UP
LEUKOCYTE ESTERASE UR-ACNC: NEGATIVE — SIGNIFICANT CHANGE UP
MCHC RBC-ENTMCNC: 31 PG — SIGNIFICANT CHANGE UP (ref 27–31)
MCHC RBC-ENTMCNC: 32.5 G/DL — SIGNIFICANT CHANGE UP (ref 32–37)
MCV RBC AUTO: 95.4 FL — HIGH (ref 80–94)
NITRITE UR-MCNC: NEGATIVE — SIGNIFICANT CHANGE UP
NRBC # BLD: 0 /100 WBCS — SIGNIFICANT CHANGE UP (ref 0–0)
PH UR: 6 — SIGNIFICANT CHANGE UP (ref 5–8)
PLATELET # BLD AUTO: 184 K/UL — SIGNIFICANT CHANGE UP (ref 130–400)
POTASSIUM SERPL-MCNC: 4.7 MMOL/L — SIGNIFICANT CHANGE UP (ref 3.5–5)
POTASSIUM SERPL-SCNC: 4.7 MMOL/L — SIGNIFICANT CHANGE UP (ref 3.5–5)
PROT SERPL-MCNC: 7.4 G/DL — SIGNIFICANT CHANGE UP (ref 6–8)
PROT UR-MCNC: SIGNIFICANT CHANGE UP
PROTHROM AB SERPL-ACNC: 11.4 SEC — SIGNIFICANT CHANGE UP (ref 9.95–12.87)
RBC # BLD: 4.13 M/UL — LOW (ref 4.7–6.1)
RBC # FLD: 15.1 % — HIGH (ref 11.5–14.5)
SODIUM SERPL-SCNC: 139 MMOL/L — SIGNIFICANT CHANGE UP (ref 135–146)
SP GR SPEC: 1.02 — SIGNIFICANT CHANGE UP (ref 1.01–1.02)
UROBILINOGEN FLD QL: SIGNIFICANT CHANGE UP
WBC # BLD: 7.78 K/UL — SIGNIFICANT CHANGE UP (ref 4.8–10.8)
WBC # FLD AUTO: 7.78 K/UL — SIGNIFICANT CHANGE UP (ref 4.8–10.8)

## 2019-10-22 PROCEDURE — 72050 X-RAY EXAM NECK SPINE 4/5VWS: CPT | Mod: 26

## 2019-10-22 PROCEDURE — 93010 ELECTROCARDIOGRAM REPORT: CPT

## 2019-10-22 PROCEDURE — 71046 X-RAY EXAM CHEST 2 VIEWS: CPT | Mod: 26

## 2019-10-22 RX ORDER — ALLOPURINOL 300 MG
1 TABLET ORAL
Qty: 0 | Refills: 0 | DISCHARGE

## 2019-10-22 NOTE — H&P PST ADULT - NSICDXPASTSURGICALHX_GEN_ALL_CORE_FT
PAST SURGICAL HISTORY:  H/O colonoscopy     H/O cystoscopy turbt x 5    History of biopsy of bladder     History of tonsillectomy

## 2019-10-22 NOTE — H&P PST ADULT - NSICDXPASTMEDICALHX_GEN_ALL_CORE_FT
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  receives igg therapy    Bladder cancer     Cancer bladder    Cataract of both eyes, unspecified cataract type hx of- not current    Chronic obstructive pulmonary disease, unspecified COPD type     GERD (gastroesophageal reflux disease)     Glaucoma of both eyes, unspecified glaucoma type     HTN (hypertension)     Obstructive sleep apnea syndrome in adult denies any machine    Pericarditis, unspecified chronicity, unspecified type 2010-- resolved    Sjogren's syndrome, with unspecified organ involvement     Smoker hx of 4 years only 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

## 2019-10-22 NOTE — H&P PST ADULT - ATTENDING COMMENTS
Pt for cysto, bx, fulgaration.  risks, benefits, alternatives discussed including possible catheter, sepsis, infection, bleeding Pt for cysto, bx, fulgaration.  risks, benefits, alternatives discussed including possible catheter, sepsis, infection, bleeding.  pt on augmentin already for sinus condition by another md

## 2019-10-22 NOTE — H&P PST ADULT - REASON FOR ADMISSION
Pt denies cp palp uri cough dysuria or sob. ET 1 FOS denies SOB . PT denies any open wounds, drainage or rashes. scheduled for cystoscopy bladder fulguration Pt denies cp palp uri cough dysuria or sob. ET 1 FOS denies SOB . PT denies any open wounds, drainage or rashes. scheduled for cystoscopy bladder fulguration. hx of RA - PT FOLLOWS DR CRUZ AT THIS TIME ONLY. ON PLAQUENIL - PT HAS HX NEUTROPENIA AND  LOW ANTIBODIES. IGG TREATMENT 1 X MONTH -

## 2019-10-23 LAB
CULTURE RESULTS: SIGNIFICANT CHANGE UP
SPECIMEN SOURCE: SIGNIFICANT CHANGE UP

## 2019-10-24 PROBLEM — M06.9 RHEUMATOID ARTHRITIS, UNSPECIFIED: Chronic | Status: ACTIVE | Noted: 2019-10-22

## 2019-10-24 PROBLEM — F17.200 NICOTINE DEPENDENCE, UNSPECIFIED, UNCOMPLICATED: Chronic | Status: ACTIVE | Noted: 2019-10-22

## 2019-10-24 PROBLEM — C67.9 MALIGNANT NEOPLASM OF BLADDER, UNSPECIFIED: Chronic | Status: ACTIVE | Noted: 2019-10-22

## 2019-10-24 PROBLEM — G47.33 OBSTRUCTIVE SLEEP APNEA (ADULT) (PEDIATRIC): Chronic | Status: ACTIVE | Noted: 2018-02-09

## 2019-10-24 PROBLEM — H26.9 UNSPECIFIED CATARACT: Chronic | Status: ACTIVE | Noted: 2018-02-09

## 2019-11-06 NOTE — ASU PATIENT PROFILE, ADULT - PSH
H/O colonoscopy    H/O cystoscopy  turbt x 5  History of biopsy of bladder    History of surgery  BILATERAL CATARACT SURGERY WITH LENS IMPLANTS  History of tonsillectomy

## 2019-11-06 NOTE — ASU PATIENT PROFILE, ADULT - PMH
Auto immune neutropenia  pt with auto immune disease causing low antibodies  receives igg therapy  Bladder cancer    Cancer  bladder  Cataract of both eyes, unspecified cataract type  hx of- not current  Chronic obstructive pulmonary disease, unspecified COPD type    GERD (gastroesophageal reflux disease)    Glaucoma of both eyes, unspecified glaucoma type    HTN (hypertension)    Obstructive sleep apnea syndrome in adult  denies any machine  Pericarditis, unspecified chronicity, unspecified type  2010-- resolved  Rheumatoid arthritis    Sjogren's syndrome, with unspecified organ involvement    Smoker  hx of 4 years only

## 2019-11-07 ENCOUNTER — RESULT REVIEW (OUTPATIENT)
Age: 74
End: 2019-11-07

## 2019-11-07 ENCOUNTER — OUTPATIENT (OUTPATIENT)
Dept: OUTPATIENT SERVICES | Facility: HOSPITAL | Age: 74
LOS: 1 days | Discharge: HOME | End: 2019-11-07
Payer: MEDICARE

## 2019-11-07 ENCOUNTER — APPOINTMENT (OUTPATIENT)
Dept: UROLOGY | Facility: HOSPITAL | Age: 74
End: 2019-11-07
Payer: MEDICARE

## 2019-11-07 VITALS
TEMPERATURE: 96 F | DIASTOLIC BLOOD PRESSURE: 73 MMHG | HEART RATE: 53 BPM | HEIGHT: 70 IN | WEIGHT: 235.01 LBS | OXYGEN SATURATION: 98 % | RESPIRATION RATE: 17 BRPM | SYSTOLIC BLOOD PRESSURE: 113 MMHG

## 2019-11-07 VITALS — SYSTOLIC BLOOD PRESSURE: 146 MMHG | HEART RATE: 54 BPM | DIASTOLIC BLOOD PRESSURE: 70 MMHG

## 2019-11-07 DIAGNOSIS — Z98.890 OTHER SPECIFIED POSTPROCEDURAL STATES: Chronic | ICD-10-CM

## 2019-11-07 PROCEDURE — 52214 CYSTOSCOPY AND TREATMENT: CPT

## 2019-11-07 PROCEDURE — 88305 TISSUE EXAM BY PATHOLOGIST: CPT | Mod: 26

## 2019-11-07 PROCEDURE — 52204 CYSTOSCOPY W/BIOPSY(S): CPT | Mod: 59

## 2019-11-07 RX ORDER — HYDROXYCHLOROQUINE SULFATE 200 MG
1 TABLET ORAL
Qty: 0 | Refills: 0 | DISCHARGE

## 2019-11-07 RX ORDER — HYDROMORPHONE HYDROCHLORIDE 2 MG/ML
1 INJECTION INTRAMUSCULAR; INTRAVENOUS; SUBCUTANEOUS
Refills: 0 | Status: DISCONTINUED | OUTPATIENT
Start: 2019-11-07 | End: 2019-11-07

## 2019-11-07 RX ORDER — PHENAZOPYRIDINE HCL 100 MG
1 TABLET ORAL
Qty: 21 | Refills: 0
Start: 2019-11-07 | End: 2019-11-13

## 2019-11-07 RX ORDER — HYDROMORPHONE HYDROCHLORIDE 2 MG/ML
0.5 INJECTION INTRAMUSCULAR; INTRAVENOUS; SUBCUTANEOUS
Refills: 0 | Status: DISCONTINUED | OUTPATIENT
Start: 2019-11-07 | End: 2019-11-07

## 2019-11-07 RX ORDER — SODIUM CHLORIDE 9 MG/ML
1000 INJECTION, SOLUTION INTRAVENOUS
Refills: 0 | Status: DISCONTINUED | OUTPATIENT
Start: 2019-11-07 | End: 2019-11-07

## 2019-11-07 RX ORDER — PHENAZOPYRIDINE HCL 100 MG
100 TABLET ORAL ONCE
Refills: 0 | Status: COMPLETED | OUTPATIENT
Start: 2019-11-07 | End: 2019-11-07

## 2019-11-07 RX ADMIN — SODIUM CHLORIDE 100 MILLILITER(S): 9 INJECTION, SOLUTION INTRAVENOUS at 09:29

## 2019-11-07 RX ADMIN — Medication 100 MILLIGRAM(S): at 09:29

## 2019-11-07 NOTE — ASU DISCHARGE PLAN (ADULT/PEDIATRIC) - CARE PROVIDER_API CALL
Aisha Fernandes)  Urology  [address] [place] [address], [address]  [place] [address]  Phone: [phone]  Fax: [phone]  Follow Up Time:

## 2019-11-07 NOTE — BRIEF OPERATIVE NOTE - NSICDXBRIEFPROCEDURE_GEN_ALL_CORE_FT
PROCEDURES:  Cystoscopy with biopsy and fulguration of bladder 07-Nov-2019 09:12:18  Aisha Fernandes

## 2019-11-08 ENCOUNTER — APPOINTMENT (OUTPATIENT)
Dept: UROLOGY | Facility: CLINIC | Age: 74
End: 2019-11-08
Payer: MEDICARE

## 2019-11-08 LAB — SURGICAL PATHOLOGY STUDY: SIGNIFICANT CHANGE UP

## 2019-11-08 PROCEDURE — 99213 OFFICE O/P EST LOW 20 MIN: CPT

## 2019-11-08 NOTE — REVIEW OF SYSTEMS
[Fever] : no fever [Chest Pain] : no chest pain [Shortness Of Breath] : no shortness of breath [Constipation] : no constipation [Confused] : no confusion

## 2019-11-08 NOTE — ASSESSMENT
[FreeTextEntry1] : Fully discussed with patient.\par \par Pathology pending. Maldonado removed for trial void. Return office one week, sooner if necessary

## 2019-11-08 NOTE — PHYSICAL EXAM
[General Appearance - In No Acute Distress] : no acute distress [Scrotum] : the scrotum was normal [Urethral Meatus] : meatus normal [] : no respiratory distress [Oriented To Time, Place, And Person] : oriented to person, place, and time [Normal Station and Gait] : the gait and station were normal for the patient's age

## 2019-11-08 NOTE — HISTORY OF PRESENT ILLNESS
[FreeTextEntry1] : One day status post biopsy and fulguration of anterior bladder tumor. His urine remains clear yellow from Maldonado catheter. Having some spasms of the catheter. No fever

## 2019-11-13 DIAGNOSIS — D49.4 NEOPLASM OF UNSPECIFIED BEHAVIOR OF BLADDER: ICD-10-CM

## 2019-11-13 DIAGNOSIS — C67.3 MALIGNANT NEOPLASM OF ANTERIOR WALL OF BLADDER: ICD-10-CM

## 2019-11-13 DIAGNOSIS — I10 ESSENTIAL (PRIMARY) HYPERTENSION: ICD-10-CM

## 2019-11-13 DIAGNOSIS — Z88.2 ALLERGY STATUS TO SULFONAMIDES: ICD-10-CM

## 2019-11-15 ENCOUNTER — APPOINTMENT (OUTPATIENT)
Dept: UROLOGY | Facility: CLINIC | Age: 74
End: 2019-11-15
Payer: MEDICARE

## 2019-11-15 PROCEDURE — 99213 OFFICE O/P EST LOW 20 MIN: CPT

## 2019-11-15 NOTE — ASSESSMENT
[FreeTextEntry1] : Fully discuss pathology And options with the patient. Recommend surveillance cystoscopy in 3 months.

## 2019-11-15 NOTE — PHYSICAL EXAM
[General Appearance - In No Acute Distress] : no acute distress [Edema] : no peripheral edema [] : no respiratory distress [Oriented To Time, Place, And Person] : oriented to person, place, and time [Normal Station and Gait] : the gait and station were normal for the patient's age

## 2019-11-15 NOTE — HISTORY OF PRESENT ILLNESS
[FreeTextEntry1] : Patient urinating well since removal of catheter post biopsy and fulguration of bladder tumor. Pathology was TA grade 1. He feels well with good urinary flow, no gross hematuria, no flank pain, no dysuria, no frequency or urgency

## 2019-11-15 NOTE — REVIEW OF SYSTEMS
[Fever] : no fever [Chest Pain] : no chest pain [Constipation] : no constipation [Shortness Of Breath] : no shortness of breath [Dysuria] : no dysuria [Confused] : no confusion

## 2020-01-05 ENCOUNTER — EMERGENCY (EMERGENCY)
Facility: HOSPITAL | Age: 75
LOS: 0 days | Discharge: HOME | End: 2020-01-06
Attending: EMERGENCY MEDICINE | Admitting: EMERGENCY MEDICINE
Payer: MEDICARE

## 2020-01-05 VITALS
TEMPERATURE: 98 F | DIASTOLIC BLOOD PRESSURE: 96 MMHG | RESPIRATION RATE: 18 BRPM | OXYGEN SATURATION: 98 % | HEART RATE: 60 BPM | SYSTOLIC BLOOD PRESSURE: 131 MMHG

## 2020-01-05 DIAGNOSIS — Z87.891 PERSONAL HISTORY OF NICOTINE DEPENDENCE: ICD-10-CM

## 2020-01-05 DIAGNOSIS — Z85.51 PERSONAL HISTORY OF MALIGNANT NEOPLASM OF BLADDER: ICD-10-CM

## 2020-01-05 DIAGNOSIS — Z79.82 LONG TERM (CURRENT) USE OF ASPIRIN: ICD-10-CM

## 2020-01-05 DIAGNOSIS — I10 ESSENTIAL (PRIMARY) HYPERTENSION: ICD-10-CM

## 2020-01-05 DIAGNOSIS — D70.8 OTHER NEUTROPENIA: ICD-10-CM

## 2020-01-05 DIAGNOSIS — Z82.49 FAMILY HISTORY OF ISCHEMIC HEART DISEASE AND OTHER DISEASES OF THE CIRCULATORY SYSTEM: ICD-10-CM

## 2020-01-05 DIAGNOSIS — G47.33 OBSTRUCTIVE SLEEP APNEA (ADULT) (PEDIATRIC): ICD-10-CM

## 2020-01-05 DIAGNOSIS — Z98.890 OTHER SPECIFIED POSTPROCEDURAL STATES: Chronic | ICD-10-CM

## 2020-01-05 DIAGNOSIS — M35.00 SJOGREN SYNDROME, UNSPECIFIED: ICD-10-CM

## 2020-01-05 DIAGNOSIS — R07.9 CHEST PAIN, UNSPECIFIED: ICD-10-CM

## 2020-01-05 DIAGNOSIS — M06.9 RHEUMATOID ARTHRITIS, UNSPECIFIED: ICD-10-CM

## 2020-01-05 LAB
ALBUMIN SERPL ELPH-MCNC: 4.6 G/DL — SIGNIFICANT CHANGE UP (ref 3.5–5.2)
ALP SERPL-CCNC: 63 U/L — SIGNIFICANT CHANGE UP (ref 30–115)
ALT FLD-CCNC: 18 U/L — SIGNIFICANT CHANGE UP (ref 0–41)
ANION GAP SERPL CALC-SCNC: 18 MMOL/L — HIGH (ref 7–14)
AST SERPL-CCNC: 31 U/L — SIGNIFICANT CHANGE UP (ref 0–41)
BILIRUB SERPL-MCNC: 0.4 MG/DL — SIGNIFICANT CHANGE UP (ref 0.2–1.2)
BUN SERPL-MCNC: 46 MG/DL — HIGH (ref 10–20)
CALCIUM SERPL-MCNC: 9.8 MG/DL — SIGNIFICANT CHANGE UP (ref 8.5–10.1)
CHLORIDE SERPL-SCNC: 102 MMOL/L — SIGNIFICANT CHANGE UP (ref 98–110)
CO2 SERPL-SCNC: 18 MMOL/L — SIGNIFICANT CHANGE UP (ref 17–32)
CREAT SERPL-MCNC: 1.6 MG/DL — HIGH (ref 0.7–1.5)
D DIMER BLD IA.RAPID-MCNC: 417 NG/ML DDU — HIGH (ref 0–230)
GLUCOSE SERPL-MCNC: 88 MG/DL — SIGNIFICANT CHANGE UP (ref 70–99)
HCT VFR BLD CALC: 40.3 % — LOW (ref 42–52)
HGB BLD-MCNC: 13.1 G/DL — LOW (ref 14–18)
MCHC RBC-ENTMCNC: 30.7 PG — SIGNIFICANT CHANGE UP (ref 27–31)
MCHC RBC-ENTMCNC: 32.5 G/DL — SIGNIFICANT CHANGE UP (ref 32–37)
MCV RBC AUTO: 94.4 FL — HIGH (ref 80–94)
NRBC # BLD: 0 /100 WBCS — SIGNIFICANT CHANGE UP (ref 0–0)
PLATELET # BLD AUTO: 179 K/UL — SIGNIFICANT CHANGE UP (ref 130–400)
POTASSIUM SERPL-MCNC: 4.5 MMOL/L — SIGNIFICANT CHANGE UP (ref 3.5–5)
POTASSIUM SERPL-SCNC: 4.5 MMOL/L — SIGNIFICANT CHANGE UP (ref 3.5–5)
PROT SERPL-MCNC: 7.6 G/DL — SIGNIFICANT CHANGE UP (ref 6–8)
RBC # BLD: 4.27 M/UL — LOW (ref 4.7–6.1)
RBC # FLD: 15 % — HIGH (ref 11.5–14.5)
SODIUM SERPL-SCNC: 138 MMOL/L — SIGNIFICANT CHANGE UP (ref 135–146)
TROPONIN T SERPL-MCNC: <0.01 NG/ML — SIGNIFICANT CHANGE UP
TROPONIN T SERPL-MCNC: <0.01 NG/ML — SIGNIFICANT CHANGE UP
WBC # BLD: 7.86 K/UL — SIGNIFICANT CHANGE UP (ref 4.8–10.8)
WBC # FLD AUTO: 7.86 K/UL — SIGNIFICANT CHANGE UP (ref 4.8–10.8)

## 2020-01-05 PROCEDURE — 99220: CPT

## 2020-01-05 PROCEDURE — 93010 ELECTROCARDIOGRAM REPORT: CPT

## 2020-01-05 PROCEDURE — 71275 CT ANGIOGRAPHY CHEST: CPT | Mod: 26

## 2020-01-05 PROCEDURE — 71045 X-RAY EXAM CHEST 1 VIEW: CPT | Mod: 26

## 2020-01-05 RX ORDER — ASPIRIN/CALCIUM CARB/MAGNESIUM 324 MG
81 TABLET ORAL ONCE
Refills: 0 | Status: COMPLETED | OUTPATIENT
Start: 2020-01-05 | End: 2020-01-05

## 2020-01-05 RX ORDER — ATENOLOL 25 MG/1
25 TABLET ORAL ONCE
Refills: 0 | Status: COMPLETED | OUTPATIENT
Start: 2020-01-05 | End: 2020-01-05

## 2020-01-05 RX ORDER — FAMOTIDINE 10 MG/ML
20 INJECTION INTRAVENOUS ONCE
Refills: 0 | Status: COMPLETED | OUTPATIENT
Start: 2020-01-05 | End: 2020-01-05

## 2020-01-05 RX ORDER — REGADENOSON 0.08 MG/ML
0.4 INJECTION, SOLUTION INTRAVENOUS ONCE
Refills: 0 | Status: DISCONTINUED | OUTPATIENT
Start: 2020-01-05 | End: 2020-01-06

## 2020-01-05 RX ORDER — DIPHENHYDRAMINE HYDROCHLORIDE AND LIDOCAINE HYDROCHLORIDE AND ALUMINUM HYDROXIDE AND MAGNESIUM HYDRO
30 KIT ONCE
Refills: 0 | Status: COMPLETED | OUTPATIENT
Start: 2020-01-05 | End: 2020-01-05

## 2020-01-05 RX ORDER — METHOCARBAMOL 500 MG/1
1000 TABLET, FILM COATED ORAL ONCE
Refills: 0 | Status: COMPLETED | OUTPATIENT
Start: 2020-01-05 | End: 2020-01-05

## 2020-01-05 RX ADMIN — Medication 81 MILLIGRAM(S): at 23:01

## 2020-01-05 RX ADMIN — ATENOLOL 25 MILLIGRAM(S): 25 TABLET ORAL at 23:01

## 2020-01-05 RX ADMIN — METHOCARBAMOL 1000 MILLIGRAM(S): 500 TABLET, FILM COATED ORAL at 23:59

## 2020-01-05 RX ADMIN — DIPHENHYDRAMINE HYDROCHLORIDE AND LIDOCAINE HYDROCHLORIDE AND ALUMINUM HYDROXIDE AND MAGNESIUM HYDRO 30 MILLILITER(S): KIT at 11:45

## 2020-01-05 RX ADMIN — FAMOTIDINE 20 MILLIGRAM(S): 10 INJECTION INTRAVENOUS at 11:45

## 2020-01-05 NOTE — ED PROVIDER NOTE - ATTENDING CONTRIBUTION TO CARE
I personally evaluated the patient. I reviewed the Resident’s or Physician Assistant’s note (as assigned above), and agree with the findings and plan except as documented in my note.     74 male here for evaluation of chest discomfort began last night at rest. Known cardiac disease with workup by Dr. Maldonado, last cath was 3 years ago with non obstructive CAD as described by patient. Pain is midsternal, pleuritic, and with no other symptoms.     ROS otherwise unremarkable    PE: male in no distress. CV: pulses intact. CHEST: normal work of breathing. ABD: nondistended. SKIN: normal. EXT: FROM. no edema NEURO: AAO 3 no focal deficits. HEENT: mucosa normal     Impression: chest pain    Plan: IV labs imaging supportive care and reevaluation

## 2020-01-05 NOTE — ED PROVIDER NOTE - CLINICAL SUMMARY MEDICAL DECISION MAKING FREE TEXT BOX
74 male here for chest discomfort. Had screening labs imaging medications and reevaluation without acute findings, plan is for disposition to EDOU in monitored for low risk chest pain management. Is known to Dr. Maldonado, via shared medical decision making plan is for EDOU stay for provocative testing in the am.

## 2020-01-05 NOTE — ED CDU PROVIDER INITIAL DAY NOTE - OBJECTIVE STATEMENT
74 y.o. male with pmx of ra, copd, htn, sjogren syndrome, non-smoker, + family hx of cad comes to ed complain of chest discomfort. states  followed up with dr. aquino cardiologist 6 months ago, stress test 1 year ago.

## 2020-01-05 NOTE — ED PROVIDER NOTE - PHYSICAL EXAMINATION
CONSTITUTIONAL: Well-developed; well-nourished; in no acute distress.   SKIN: warm, dry  HEAD: Normocephalic; atraumatic.  EYES: PERRL, EOMI, no conjunctival erythema  ENT: No nasal discharge; airway clear.  NECK: Supple; non tender.  CARD: S1, S2 normal; no murmurs, gallops, or rubs. Regular rate and rhythm.   RESP: No wheezes, rales or rhonchi.  ABD: soft ntnd  EXT: Normal ROM.  No clubbing, cyanosis or edema. Tenderness to palpation at the xyphoid region.  LYMPH: No acute cervical adenopathy.  NEURO: Alert, oriented, grossly unremarkable  PSYCH: Cooperative, appropriate.

## 2020-01-05 NOTE — ED CDU PROVIDER INITIAL DAY NOTE - ATTENDING CONTRIBUTION TO CARE
75yo man h/o HTN, COPD, RA, sjogren syndrome was placed in CDU for ACS workup after he complained of chest pain. ED workup was unremarkable. Plan is for serial EKG/enzymes, speak with cardiologist (Dr Maldonado) re any recent workup, possible provocative testing.

## 2020-01-05 NOTE — ED PROVIDER NOTE - OBJECTIVE STATEMENT
74y M w/ PMH of bladder ca, RA, COPD, HTN, GERD, and Sjogren's syndrome presents with chest pain that started at rest since midnight yesterday night. States pulling pain in the epigastric pain without radiation. Is worse with inspiration. No other associated symptoms. Took 325mg ASA prior to coming in. Last cardiac testing (stress test) > 1 yr ago. Denies fever, chills, SOB, abd pain, n/v/d, or numbness/tingling.      Cardiologist: Dr. Gonsales

## 2020-01-05 NOTE — ED CDU PROVIDER INITIAL DAY NOTE - MEDICAL DECISION MAKING DETAILS
73yo man h/o HTN, COPD, RA, sjogren syndrome was placed in CDU for ACS workup after he complained of chest pain. ED workup was unremarkable. Plan is for serial EKG/enzymes, speak with cardiologist (Dr Maldonado) re any recent workup, possible provocative testing.

## 2020-01-05 NOTE — ED ADULT NURSE NOTE - OBJECTIVE STATEMENT
pt presents with chest pain that started at rest since midnight yesterday night. States pulling pain in the epigastric pain without radiation. Is worse with inspiration. No other associated symptoms. Denies fever, chills, SOB, abd pain, n/v/d, or numbness/tingling.

## 2020-01-06 VITALS
DIASTOLIC BLOOD PRESSURE: 73 MMHG | HEART RATE: 64 BPM | SYSTOLIC BLOOD PRESSURE: 108 MMHG | RESPIRATION RATE: 18 BRPM | OXYGEN SATURATION: 97 %

## 2020-01-06 PROCEDURE — 99217: CPT

## 2020-01-06 RX ORDER — KETOROLAC TROMETHAMINE 30 MG/ML
15 SYRINGE (ML) INJECTION ONCE
Refills: 0 | Status: DISCONTINUED | OUTPATIENT
Start: 2020-01-06 | End: 2020-01-06

## 2020-01-06 RX ADMIN — Medication 15 MILLIGRAM(S): at 08:50

## 2020-01-06 RX ADMIN — Medication 50 MILLIGRAM(S): at 09:11

## 2020-01-06 NOTE — ED CDU PROVIDER SUBSEQUENT DAY NOTE - FAMILY HISTORY
Family history of coronary artery disease     Father  Still living? No  Cancer, Age at diagnosis: Age Unknown

## 2020-01-06 NOTE — ED CDU PROVIDER DISPOSITION NOTE - NSFOLLOWUPINSTRUCTIONS_ED_ALL_ED_FT
follow up with Dr. Grayson in office and PMD     Chest Pain    Chest pain can be caused by many different conditions which may or may not be dangerous. Causes include heartburn, lung infections, heart attack, blood clot in lungs, skin infections, strain or damage to muscle, cartilage, or bones, etc. In addition to a history and physical examination, an electrocardiogram (ECG) or other lab tests may have been performed to determine the cause of your chest pain. Follow up with your primary care provider or with a cardiologist as instructed.     SEEK IMMEDIATE MEDICAL CARE IF YOU HAVE ANY OF THE FOLLOWING SYMPTOMS: worsening chest pain, coughing up blood, unexplained back/neck/jaw pain, severe abdominal pain, dizziness or lightheadedness, fainting, shortness of breath, sweaty or clammy skin, vomiting, or racing heart beat. These symptoms may represent a serious problem that is an emergency. Do not wait to see if the symptoms will go away. Get medical help right away. Call 911 and do not drive yourself to the hospital.

## 2020-01-06 NOTE — ED CDU PROVIDER SUBSEQUENT DAY NOTE - MEDICAL DECISION MAKING DETAILS
75yo man h/o HTN, COPD, RA, sjogren syndrome was placed in CDU for ACS workup after he complained of chest pain. ED workup was unremarkable. Pt was monitored overnight without incident, however he did develop some URI sx with rhinorrhea and cough. On exam he is nontoxic but uncomfortable, VS as noted. Very tender to palpation of the xiphoid process and lower sternum, which hurt more with deep inspiration. Tenderness to palpation of the paraspinal midthoracic region as well, which is worsened with movement. Of note, pt does not feel sx are cardiac in origin, feels more like his autoimmune sx. I spoke with Dr Mike for Dr Maldonado; we reviewed prior results (nonobstructive CAD on 2015 cath and stress test within the last year was negative). Repeat EKG and enzymes in CDU unchanged. Sx improved with NSAIDS. Pt comfortable with d/c and prompt f/u with PMD/cardiology. All results (including noncardiac findings from EDU workup) were discussed with pt and copies were given for PMD. He will return to the ED for worsening sx.

## 2020-01-06 NOTE — ED CDU PROVIDER DISPOSITION NOTE - PATIENT PORTAL LINK FT
You can access the FollowMyHealth Patient Portal offered by [facility] by registering at the following website: [email or web address]. By joining [facility]’s FollowMyHealth portal, you will also be able to view your health information using other applications (apps) compatible with our system.

## 2020-01-06 NOTE — ED CDU PROVIDER SUBSEQUENT DAY NOTE - PROGRESS NOTE DETAILS
cp seems muscular in nature, cp is worse with movement from side to side, robaxin was given. will call pt.'s cardiologist in the morning cp seems muscular in nature, cp is worse with movement from side to side, robaxin was given. will continue to reassess. pt seen bedside, NAD, no complaints overnight, asymptomatic. Negative cardiac enzymes x2 and nl ekg. pt scheduled to go for pharm nuc however he had one recently that was normal and non obstructive cath in 2015 as per Dr. Grayson. Will dispo patient home with follow up.

## 2020-01-06 NOTE — ED CDU PROVIDER DISPOSITION NOTE - CARE PROVIDER_API CALL
Shawn Grayson)  Cardiovascular Disease; Interventional Cardiology  [address] [place] [address]  Phone: [phone]  Fax: [phone]  Follow Up Time:

## 2020-01-06 NOTE — ED CDU PROVIDER SUBSEQUENT DAY NOTE - ATTENDING CONTRIBUTION TO CARE
73yo man h/o HTN, COPD, RA, sjogren syndrome was placed in CDU for ACS workup after he complained of chest pain. ED workup was unremarkable. Pt was monitored overnight without incident, however he did develop some URI sx with rhinorrhea and cough. On exam he is nontoxic but uncomfortable, VS as noted. Very tender to palpation of the xiphoid process and lower sternum, which hurt more with deep inspiration. Tenderness to palpation of the paraspinal midthoracic region as well, which is worsened with movement. Of note, pt does not feel sx are cardiac in origin, feels more like his autoimmune sx. I spoke with Dr Mike for Dr Maldonado; we reviewed prior results (nonobstructive CAD on 2015 cath and stress test within the last year was negative). Repeat EKG and enzymes in CDU unchanged. Sx improved with NSAIDS. Pt comfortable with d/c and prompt f/u with PMD/cardiology. All results (including noncardiac findings from EDU workup) were discussed with pt and copies were given for PMD. He will return to the ED for worsening sx.

## 2020-01-06 NOTE — ED CDU PROVIDER DISPOSITION NOTE - ATTENDING CONTRIBUTION TO CARE
73yo man h/o HTN, COPD, RA, sjogren syndrome was placed in CDU for ACS workup after he complained of chest pain. ED workup was unremarkable. Pt was monitored overnight without incident, however he did develop some URI sx with rhinorrhea and cough. On exam he is nontoxic but uncomfortable, VS as noted. Very tender to palpation of the xiphoid process and lower sternum, which hurt more with deep inspiration. Tenderness to palpation of the paraspinal midthoracic region as well, which is worsened with movement. Of note, pt does not feel sx are cardiac in origin, feels more like his autoimmune sx. I spoke with Dr Mike for Dr Maldonado; we reviewed prior results (nonobstructive CAD on 2015 cath and stress test within the last year was negative). Repeat EKG and enzymes in CDU unchanged. Sx improved with NSAIDS and prednisone. Pt comfortable with d/c and prompt f/u with PMD/cardiology. All results (including noncardiac findings on CTA from ED workup) were discussed with pt and copies were given for PMD. He will return to the ED for worsening sx.

## 2020-01-07 NOTE — ED POST DISCHARGE NOTE - DETAILS
DISCUSSED FINDINGS WITH PATIENT, HE WILL CALL PMD TODAY FOR F/U ON MASSLIKE CONSOLIDATION AND OPACITIES NOTED.

## 2020-01-07 NOTE — ED POST DISCHARGE NOTE - RESULT SUMMARY
CT CHEST ANGIO- MASSLIKE CONSOLIDATION 1.7 CM R LOWER LOBE AND 1.1 CM LEFT UPPER LOBE OPACITY. CONSIDER SHORT TERM F/U CHEST CT.

## 2020-02-18 NOTE — ASU DISCHARGE PLAN (ADULT/PEDIATRIC). - ASU FOLLOWUP
[facility]:  Ambulatory Surgery [facility]... Tarsorrhaphy Text: A tarsorrhaphy was performed using Frost sutures.

## 2020-02-19 ENCOUNTER — APPOINTMENT (OUTPATIENT)
Dept: UROLOGY | Facility: CLINIC | Age: 75
End: 2020-02-19
Payer: COMMERCIAL

## 2020-02-19 PROCEDURE — 52000 CYSTOURETHROSCOPY: CPT

## 2020-03-28 ENCOUNTER — RX RENEWAL (OUTPATIENT)
Age: 75
End: 2020-03-28

## 2020-06-17 ENCOUNTER — APPOINTMENT (OUTPATIENT)
Dept: UROLOGY | Facility: CLINIC | Age: 75
End: 2020-06-17
Payer: COMMERCIAL

## 2020-06-17 DIAGNOSIS — N13.8 BENIGN PROSTATIC HYPERPLASIA WITH LOWER URINARY TRACT SYMPMS: ICD-10-CM

## 2020-06-17 DIAGNOSIS — N40.1 BENIGN PROSTATIC HYPERPLASIA WITH LOWER URINARY TRACT SYMPMS: ICD-10-CM

## 2020-06-17 PROCEDURE — 99214 OFFICE O/P EST MOD 30 MIN: CPT | Mod: 25

## 2020-06-17 PROCEDURE — 52000 CYSTOURETHROSCOPY: CPT

## 2020-06-17 NOTE — HISTORY OF PRESENT ILLNESS
[FreeTextEntry1] : 74-year-old with history of bladder cancer. He feels well without any urinary complaint.He has a history of low-grade bladder cancer and today's cystoscopy showed a solitary papillary recurrence superior bladder wall. There was no gross hematuria, no flank pain, no dysuria, no frequency, no urgency. He has a history of BPH.No family history of prostate cancer

## 2020-06-17 NOTE — ASSESSMENT
[FreeTextEntry1] : Recurrent bladder cancer. Risks benefits and alternatives discussed and will schedule for cystoscopy transurethral resection bladder tumor. Discussed postop catheter, infection, perforation, bleeding.Patient with benign prostatic hyperplasia. No significant symptomatology the patient is not bothered. No treatment required\par \par

## 2020-06-17 NOTE — REVIEW OF SYSTEMS
[Fever] : no fever [Chest Pain] : no chest pain [Feeling Poorly] : not feeling poorly [Lower Ext Edema] : no extremity edema [Cough] : no cough [Shortness Of Breath] : no shortness of breath [Diarrhea] : no diarrhea [Constipation] : no constipation [Abdominal Pain] : no abdominal pain [Joint Swelling] : no joint swelling [Dysuria] : no dysuria [Hesitancy] : no urinary hesitancy [Skin Lesions] : no skin lesions [Joint Stiffness] : no joint stiffness [Skin Wound] : no skin wound [Difficulty Walking] : no difficulty walking [Confused] : no confusion [Muscle Weakness] : no muscle weakness [Anxiety] : no anxiety [Depression] : no depression [Feelings Of Weakness] : no feelings of weakness [Easy Bleeding] : no tendency for easy bleeding [Easy Bruising] : no tendency for easy bruising

## 2020-06-17 NOTE — PHYSICAL EXAM
[Abdomen Soft] : soft [General Appearance - In No Acute Distress] : no acute distress [Normal Appearance] : normal appearance [Abdomen Tenderness] : non-tender [Urethral Meatus] : meatus normal [Penis Abnormality] : normal uncircumcised penis [Scrotum] : the scrotum was normal [Skin Color & Pigmentation] : normal skin color and pigmentation [Heart Rate And Rhythm] : Heart rate and rhythm were normal [Edema] : no peripheral edema [] : no respiratory distress [Oriented To Time, Place, And Person] : oriented to person, place, and time [Respiration, Rhythm And Depth] : normal respiratory rhythm and effort [Affect] : the affect was normal [Normal Station and Gait] : the gait and station were normal for the patient's age [No Palpable Adenopathy] : no palpable adenopathy

## 2020-06-19 NOTE — PRE-ANESTHESIA EVALUATION ADULT - MALLAMPATI CLASS
[FreeTextEntry1] : Abdominal Pain: The patient complains of abdominal pain. The patient is to avoid nonsteroidal anti-inflammatory drugs and aspirin.  I recommend a trial of Phazyme 1 tablet PO 3 times a day for 3 months for the symptoms.  I recommend continue on a trial of Nexium 40 mg once a day for 3 months for the symptoms.  \par Dyspepsia: The patient complains of dyspeptic symptoms.  The patient was advised to abide by an anti-gas diet.  The patient was given a pamphlet for anti-gas.  The patient and I reviewed the anti-gas diet at length. The patient is to start on a trial of Phazyme one tablet 3 times a day p.r.n. abdominal pain and gas.\par GERD: The patient was advised to avoid late-night meals and dietary indiscretions.  The patient was advised to avoid fried and fatty foods.  The patient was advised to abide by an anti-GERD diet. The patient was given a pamphlet for anti-GERD.  The patient and I reviewed the anti-GERD diet at length. I recommend a trial of continue on Nexium 40 mg once a day x 3 months for the symptoms.\par Dysphagia: The patient complains of dysphagia of unclear etiology.   The dysphagia is worse with meals but not with liquids.  The upper endoscopy was unremarkable.  I recommend a barium swallow to assess the dysphagia.  The patient may require motility studies to assess the etiology of the dysphagia pending barium swallow results.  The patient agrees and will follow-up for further work-up and treatment.\par Constipation: The patient complains of constipation. I recommend a high-fiber diet. I recommend a trial of a probiotic such as Align once a day. I recommend a trial of Metamucil once a day for fiber supplementation.  If the symptoms persist, the patient may require a trial of Linzess 145 mcg once a day for the constipation.   The patient agreed and will followup to reassess the symptoms.  \par History of Pancreatic Cyst: The patient was previously diagnosed with a pancreatic cystic lesion on prior imaging study. The patient denies any jaundice, pruritus or back pain. The patient complains of abdominal pain. The patient denies any prior history of EtOH abuse. The patient had a CAT scan of the abdomen and pelvis with IV contrast performed on July 3, 2019. The CAT scan of the abdomen and pelvis with IV contrast revealed a markedly limited evaluation of the pancreas. The pancreatic tissue is atrophic. There was beam hardening artifact, motion artifact and lack of small bowel opacification that contributed to limited evaluation. Also noted was a single borderline enlarged periaortic lymph node, a constipated colon and a distended urinary bladder.  The recent CAT scan of the abdomen and pelvis did not reveal a pancreatic cystic lesion.  I recommend a repeat imaging study of the pancreas with IV contrast to reassess the pancreatic cystic lesions in 1 year unless symptomatic. The patient is aware of the potential risks of pancreatic cysts progressing to pancreatic cancer. The patient may require an endoscopic ultrasound of pancreas with possible fine-needle aspiration to better assess the pancreatic cystic lesions pending results of the repeat imaging study. The patient is aware of the importance for followup. The patient agrees and will followup. \par Follow-up: The patient is to follow-up in the office in 1 to 2 months to reassess the symptoms. The patient was told to call the office if any further problems. \par \par \par \par \par \par \par \par \par \par  Class III - visualization of the soft palate and the base of the uvula

## 2020-07-10 ENCOUNTER — RESULT REVIEW (OUTPATIENT)
Age: 75
End: 2020-07-10

## 2020-07-10 ENCOUNTER — OUTPATIENT (OUTPATIENT)
Dept: OUTPATIENT SERVICES | Facility: HOSPITAL | Age: 75
LOS: 1 days | Discharge: HOME | End: 2020-07-10
Payer: MEDICARE

## 2020-07-10 VITALS
DIASTOLIC BLOOD PRESSURE: 68 MMHG | HEIGHT: 70 IN | WEIGHT: 240.08 LBS | TEMPERATURE: 98 F | SYSTOLIC BLOOD PRESSURE: 116 MMHG | HEART RATE: 57 BPM | OXYGEN SATURATION: 98 % | RESPIRATION RATE: 18 BRPM

## 2020-07-10 DIAGNOSIS — C67.9 MALIGNANT NEOPLASM OF BLADDER, UNSPECIFIED: ICD-10-CM

## 2020-07-10 DIAGNOSIS — Z01.818 ENCOUNTER FOR OTHER PREPROCEDURAL EXAMINATION: ICD-10-CM

## 2020-07-10 DIAGNOSIS — Z98.890 OTHER SPECIFIED POSTPROCEDURAL STATES: Chronic | ICD-10-CM

## 2020-07-10 LAB
ALBUMIN SERPL ELPH-MCNC: 4.3 G/DL — SIGNIFICANT CHANGE UP (ref 3.5–5.2)
ALP SERPL-CCNC: 59 U/L — SIGNIFICANT CHANGE UP (ref 30–115)
ALT FLD-CCNC: 16 U/L — SIGNIFICANT CHANGE UP (ref 0–41)
ANION GAP SERPL CALC-SCNC: 12 MMOL/L — SIGNIFICANT CHANGE UP (ref 7–14)
APPEARANCE UR: CLEAR — SIGNIFICANT CHANGE UP
APTT BLD: 29.7 SEC — SIGNIFICANT CHANGE UP (ref 27–39.2)
AST SERPL-CCNC: 24 U/L — SIGNIFICANT CHANGE UP (ref 0–41)
BACTERIA # UR AUTO: NEGATIVE — SIGNIFICANT CHANGE UP
BASOPHILS # BLD AUTO: 0.08 K/UL — SIGNIFICANT CHANGE UP (ref 0–0.2)
BASOPHILS NFR BLD AUTO: 1.2 % — HIGH (ref 0–1)
BILIRUB SERPL-MCNC: 0.3 MG/DL — SIGNIFICANT CHANGE UP (ref 0.2–1.2)
BILIRUB UR-MCNC: NEGATIVE — SIGNIFICANT CHANGE UP
BUN SERPL-MCNC: 38 MG/DL — HIGH (ref 10–20)
CALCIUM SERPL-MCNC: 9.2 MG/DL — SIGNIFICANT CHANGE UP (ref 8.5–10.1)
CHLORIDE SERPL-SCNC: 111 MMOL/L — HIGH (ref 98–110)
CO2 SERPL-SCNC: 21 MMOL/L — SIGNIFICANT CHANGE UP (ref 17–32)
COLOR SPEC: SIGNIFICANT CHANGE UP
CREAT SERPL-MCNC: 1.4 MG/DL — SIGNIFICANT CHANGE UP (ref 0.7–1.5)
DIFF PNL FLD: NEGATIVE — SIGNIFICANT CHANGE UP
EOSINOPHIL # BLD AUTO: 0.39 K/UL — SIGNIFICANT CHANGE UP (ref 0–0.7)
EOSINOPHIL NFR BLD AUTO: 5.7 % — SIGNIFICANT CHANGE UP (ref 0–8)
EPI CELLS # UR: 2 /HPF — SIGNIFICANT CHANGE UP (ref 0–5)
GLUCOSE SERPL-MCNC: 85 MG/DL — SIGNIFICANT CHANGE UP (ref 70–99)
GLUCOSE UR QL: NEGATIVE — SIGNIFICANT CHANGE UP
HCT VFR BLD CALC: 34.4 % — LOW (ref 42–52)
HGB BLD-MCNC: 11.3 G/DL — LOW (ref 14–18)
HYALINE CASTS # UR AUTO: 3 /LPF — SIGNIFICANT CHANGE UP (ref 0–7)
IMM GRANULOCYTES NFR BLD AUTO: 0.3 % — SIGNIFICANT CHANGE UP (ref 0.1–0.3)
INR BLD: 1 RATIO — SIGNIFICANT CHANGE UP (ref 0.65–1.3)
KETONES UR-MCNC: NEGATIVE — SIGNIFICANT CHANGE UP
LEUKOCYTE ESTERASE UR-ACNC: ABNORMAL
LYMPHOCYTES # BLD AUTO: 2.14 K/UL — SIGNIFICANT CHANGE UP (ref 1.2–3.4)
LYMPHOCYTES # BLD AUTO: 31.3 % — SIGNIFICANT CHANGE UP (ref 20.5–51.1)
MCHC RBC-ENTMCNC: 31.4 PG — HIGH (ref 27–31)
MCHC RBC-ENTMCNC: 32.8 G/DL — SIGNIFICANT CHANGE UP (ref 32–37)
MCV RBC AUTO: 95.6 FL — HIGH (ref 80–94)
MONOCYTES # BLD AUTO: 0.49 K/UL — SIGNIFICANT CHANGE UP (ref 0.1–0.6)
MONOCYTES NFR BLD AUTO: 7.2 % — SIGNIFICANT CHANGE UP (ref 1.7–9.3)
NEUTROPHILS # BLD AUTO: 3.72 K/UL — SIGNIFICANT CHANGE UP (ref 1.4–6.5)
NEUTROPHILS NFR BLD AUTO: 54.3 % — SIGNIFICANT CHANGE UP (ref 42.2–75.2)
NITRITE UR-MCNC: NEGATIVE — SIGNIFICANT CHANGE UP
NRBC # BLD: 0 /100 WBCS — SIGNIFICANT CHANGE UP (ref 0–0)
PH UR: 6 — SIGNIFICANT CHANGE UP (ref 5–8)
PLATELET # BLD AUTO: 166 K/UL — SIGNIFICANT CHANGE UP (ref 130–400)
POTASSIUM SERPL-MCNC: 4.4 MMOL/L — SIGNIFICANT CHANGE UP (ref 3.5–5)
POTASSIUM SERPL-SCNC: 4.4 MMOL/L — SIGNIFICANT CHANGE UP (ref 3.5–5)
PROT SERPL-MCNC: 7.1 G/DL — SIGNIFICANT CHANGE UP (ref 6–8)
PROT UR-MCNC: SIGNIFICANT CHANGE UP
PROTHROM AB SERPL-ACNC: 11.5 SEC — SIGNIFICANT CHANGE UP (ref 9.95–12.87)
RBC # BLD: 3.6 M/UL — LOW (ref 4.7–6.1)
RBC # FLD: 16.1 % — HIGH (ref 11.5–14.5)
RBC CASTS # UR COMP ASSIST: 1 /HPF — SIGNIFICANT CHANGE UP (ref 0–4)
SODIUM SERPL-SCNC: 144 MMOL/L — SIGNIFICANT CHANGE UP (ref 135–146)
SP GR SPEC: 1.02 — SIGNIFICANT CHANGE UP (ref 1.01–1.02)
UROBILINOGEN FLD QL: SIGNIFICANT CHANGE UP
WBC # BLD: 6.84 K/UL — SIGNIFICANT CHANGE UP (ref 4.8–10.8)
WBC # FLD AUTO: 6.84 K/UL — SIGNIFICANT CHANGE UP (ref 4.8–10.8)
WBC UR QL: 10 /HPF — HIGH (ref 0–5)

## 2020-07-10 PROCEDURE — 72050 X-RAY EXAM NECK SPINE 4/5VWS: CPT | Mod: 26

## 2020-07-10 PROCEDURE — 93010 ELECTROCARDIOGRAM REPORT: CPT

## 2020-07-10 PROCEDURE — 71046 X-RAY EXAM CHEST 2 VIEWS: CPT | Mod: 26

## 2020-07-10 NOTE — H&P PST ADULT - NSICDXPASTSURGICALHX_GEN_ALL_CORE_FT
PAST SURGICAL HISTORY:  H/O colonoscopy     H/O cystoscopy turbt x 5    History of biopsy of bladder     History of surgery BILATERAL CATARACT SURGERY WITH LENS IMPLANTS    History of tonsillectomy

## 2020-07-10 NOTE — H&P PST ADULT - NSWEIGHTCALCTOOLDRUG_GEN_A_CORE
Daily Note     Today's date: 2019  Patient name: Michelle Prakash  : 1951  MRN: [id number]  Referring provider: Ruiz Restrepo MD  Dx:   Encounter Diagnosis     ICD-10-CM    1  Primary osteoarthritis of right knee M17 11                   Subjective:  Pt reports improved mobility overall      Objective: See treatment diary below      Assessment: Tolerated treatment well  Patient would benefit from continued PT      Plan: Continue per plan of care  Precautions: right TKA (DOS: 10/22/18), trigeminal neuralgia    Daily Treatment Diary     Manual  19           Right knee PROM  VK           Right gastroc, hamstring, hip flexor str  VK           Gr  II-IV pat  mobs             Reevaluation GR                             Exercise Diary  19           Recumbent bike 10' 10min           VG             Long-sitting gastroc str  TKE             Knee flexion mach  50# 3x10           Knee ext  Mach  50# 3x10           Supine SLR  3# 3x10           Standing hip abd, ext with TB             SAQ             Seated knee extension, flexion AAROM             Slantboard gastroc str    5x30"                        LAQ             FSU  6" 3x10           FSD  6" 3x10           SLS             VG: u/l, L7 4' 4 min           LSD             Leg press  135# 3x10           Backwards lunges with slider             Hip abduction - s/l             Treadmill                 Modalities              CP knee ext  used

## 2020-07-10 NOTE — H&P PST ADULT - ATTENDING COMMENTS
Hx of bladder ca for turbt.  Risks, benefits, alternatives discussed including sepsis, bleeding, post op catheter, perforation

## 2020-07-10 NOTE — H&P PST ADULT - NSICDXPASTMEDICALHX_GEN_ALL_CORE_FT
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

## 2020-07-10 NOTE — H&P PST ADULT - HISTORY OF PRESENT ILLNESS
pmhx of bladder ca now for above procedure, tumors reappear and procedure is done almost bi- annually    PATIENT CURRENTLY DENIES CHEST PAIN  SHORTNESS OF BREATH  PALPITATIONS,  DYSURIA, OR UPPER RESPIRATORY INFECTION IN PAST 2 WEEKS  EXERCISE  TOLERANCE  1-2 FLIGHT OF STAIRS  WITHOUT SHORTNESS OF BREATH   denies travel outside the [place] in the past 30 days   pt denies any covid s/s, or tested positive in the past

## 2020-07-11 LAB
CULTURE RESULTS: SIGNIFICANT CHANGE UP
SPECIMEN SOURCE: SIGNIFICANT CHANGE UP

## 2020-07-13 ENCOUNTER — LABORATORY RESULT (OUTPATIENT)
Age: 75
End: 2020-07-13

## 2020-07-13 ENCOUNTER — OUTPATIENT (OUTPATIENT)
Dept: OUTPATIENT SERVICES | Facility: HOSPITAL | Age: 75
LOS: 1 days | Discharge: HOME | End: 2020-07-13

## 2020-07-13 DIAGNOSIS — Z98.890 OTHER SPECIFIED POSTPROCEDURAL STATES: Chronic | ICD-10-CM

## 2020-07-13 DIAGNOSIS — Z11.59 ENCOUNTER FOR SCREENING FOR OTHER VIRAL DISEASES: ICD-10-CM

## 2020-07-20 ENCOUNTER — LABORATORY RESULT (OUTPATIENT)
Age: 75
End: 2020-07-20

## 2020-07-20 ENCOUNTER — OUTPATIENT (OUTPATIENT)
Dept: OUTPATIENT SERVICES | Facility: HOSPITAL | Age: 75
LOS: 1 days | Discharge: HOME | End: 2020-07-20

## 2020-07-20 DIAGNOSIS — Z11.59 ENCOUNTER FOR SCREENING FOR OTHER VIRAL DISEASES: ICD-10-CM

## 2020-07-20 DIAGNOSIS — Z98.890 OTHER SPECIFIED POSTPROCEDURAL STATES: Chronic | ICD-10-CM

## 2020-07-22 NOTE — ASU PATIENT PROFILE, ADULT - PMH
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

## 2020-07-23 ENCOUNTER — OUTPATIENT (OUTPATIENT)
Dept: OUTPATIENT SERVICES | Facility: HOSPITAL | Age: 75
LOS: 1 days | Discharge: HOME | End: 2020-07-23
Payer: COMMERCIAL

## 2020-07-23 ENCOUNTER — APPOINTMENT (OUTPATIENT)
Dept: UROLOGY | Facility: HOSPITAL | Age: 75
End: 2020-07-23

## 2020-07-23 ENCOUNTER — RESULT REVIEW (OUTPATIENT)
Age: 75
End: 2020-07-23

## 2020-07-23 VITALS
HEART RATE: 57 BPM | RESPIRATION RATE: 16 BRPM | DIASTOLIC BLOOD PRESSURE: 72 MMHG | SYSTOLIC BLOOD PRESSURE: 116 MMHG | TEMPERATURE: 97 F | WEIGHT: 229.94 LBS | OXYGEN SATURATION: 99 % | HEIGHT: 70 IN

## 2020-07-23 VITALS — RESPIRATION RATE: 18 BRPM | HEART RATE: 56 BPM | DIASTOLIC BLOOD PRESSURE: 73 MMHG | SYSTOLIC BLOOD PRESSURE: 143 MMHG

## 2020-07-23 DIAGNOSIS — Z98.890 OTHER SPECIFIED POSTPROCEDURAL STATES: Chronic | ICD-10-CM

## 2020-07-23 PROCEDURE — 52235 CYSTOSCOPY AND TREATMENT: CPT

## 2020-07-23 PROCEDURE — 88307 TISSUE EXAM BY PATHOLOGIST: CPT | Mod: 26

## 2020-07-23 RX ORDER — HYDROMORPHONE HYDROCHLORIDE 2 MG/ML
0.5 INJECTION INTRAMUSCULAR; INTRAVENOUS; SUBCUTANEOUS
Refills: 0 | Status: DISCONTINUED | OUTPATIENT
Start: 2020-07-23 | End: 2020-07-23

## 2020-07-23 RX ORDER — PHENAZOPYRIDINE HCL 100 MG
1 TABLET ORAL
Qty: 21 | Refills: 0
Start: 2020-07-23 | End: 2020-07-29

## 2020-07-23 RX ORDER — CEPHALEXIN 500 MG
1 CAPSULE ORAL
Qty: 12 | Refills: 0
Start: 2020-07-23 | End: 2020-07-25

## 2020-07-23 RX ORDER — PHENAZOPYRIDINE HCL 100 MG
100 TABLET ORAL ONCE
Refills: 0 | Status: COMPLETED | OUTPATIENT
Start: 2020-07-23 | End: 2020-07-23

## 2020-07-23 RX ORDER — SODIUM CHLORIDE 9 MG/ML
1000 INJECTION, SOLUTION INTRAVENOUS
Refills: 0 | Status: DISCONTINUED | OUTPATIENT
Start: 2020-07-23 | End: 2020-08-07

## 2020-07-23 RX ORDER — OXYCODONE AND ACETAMINOPHEN 5; 325 MG/1; MG/1
1 TABLET ORAL EVERY 4 HOURS
Refills: 0 | Status: DISCONTINUED | OUTPATIENT
Start: 2020-07-23 | End: 2020-07-23

## 2020-07-23 RX ADMIN — SODIUM CHLORIDE 100 MILLILITER(S): 9 INJECTION, SOLUTION INTRAVENOUS at 11:18

## 2020-07-23 RX ADMIN — Medication 100 MILLIGRAM(S): at 11:18

## 2020-07-23 NOTE — ASU DISCHARGE PLAN (ADULT/PEDIATRIC) - CARE PROVIDER_API CALL
Aisha Fernandes  UROLOGY  [address] [place] [address]  Phone: [phone]  Fax: [phone]  Follow Up Time: 1-3 days

## 2020-07-23 NOTE — PRE-ANESTHESIA EVALUATION ADULT - NSANTHOSAYNRD_GEN_A_CORE
+ kirill/No. KIRILL screening performed.  STOP BANG Legend: 0-2 = LOW Risk; 3-4 = INTERMEDIATE Risk; 5-8 = HIGH Risk

## 2020-07-23 NOTE — BRIEF OPERATIVE NOTE - NSICDXBRIEFPROCEDURE_GEN_ALL_CORE_FT
PROCEDURES:  Cystoscopy with fulguration and resection of bladder tumor 23-Jul-2020 10:58:24  Aisha Fernandes

## 2020-07-24 ENCOUNTER — TRANSCRIPTION ENCOUNTER (OUTPATIENT)
Age: 75
End: 2020-07-24

## 2020-07-24 ENCOUNTER — APPOINTMENT (OUTPATIENT)
Dept: UROLOGY | Facility: CLINIC | Age: 75
End: 2020-07-24
Payer: COMMERCIAL

## 2020-07-24 PROBLEM — M10.9 GOUT, UNSPECIFIED: Chronic | Status: ACTIVE | Noted: 2020-07-23

## 2020-07-24 PROCEDURE — 99213 OFFICE O/P EST LOW 20 MIN: CPT

## 2020-07-24 NOTE — HISTORY OF PRESENT ILLNESS
[FreeTextEntry1] : History of bladder cancer one day post TURBT. Urine remains clear via catheter. There is no fever, no flank pain, no abdominal pain. He is experiencing some mild bladder spasms

## 2020-07-24 NOTE — PHYSICAL EXAM
[General Appearance - In No Acute Distress] : no acute distress [Penis Abnormality] : normal uncircumcised penis [Scrotum] : the scrotum was normal [Respiration, Rhythm And Depth] : normal respiratory rhythm and effort [Edema] : no peripheral edema [] : no respiratory distress [Oriented To Time, Place, And Person] : oriented to person, place, and time [Not Anxious] : not anxious [Normal Station and Gait] : the gait and station were normal for the patient's age

## 2020-07-24 NOTE — REVIEW OF SYSTEMS
[Feeling Tired] : not feeling tired [Feeling Poorly] : not feeling poorly [Chest Pain] : no chest pain [Cough] : no cough [Shortness Of Breath] : no shortness of breath [Diarrhea] : no diarrhea [Constipation] : no constipation [Confused] : no confusion

## 2020-07-24 NOTE — ASSESSMENT
[FreeTextEntry1] : Catheter removed for trial of voiding. Pathology pending. Return to office one week

## 2020-07-27 LAB — SURGICAL PATHOLOGY STUDY: SIGNIFICANT CHANGE UP

## 2020-07-29 ENCOUNTER — APPOINTMENT (OUTPATIENT)
Dept: UROLOGY | Facility: CLINIC | Age: 75
End: 2020-07-29
Payer: MEDICARE

## 2020-07-29 DIAGNOSIS — G47.33 OBSTRUCTIVE SLEEP APNEA (ADULT) (PEDIATRIC): ICD-10-CM

## 2020-07-29 DIAGNOSIS — Z88.2 ALLERGY STATUS TO SULFONAMIDES: ICD-10-CM

## 2020-07-29 DIAGNOSIS — I10 ESSENTIAL (PRIMARY) HYPERTENSION: ICD-10-CM

## 2020-07-29 DIAGNOSIS — D49.4 NEOPLASM OF UNSPECIFIED BEHAVIOR OF BLADDER: ICD-10-CM

## 2020-07-29 DIAGNOSIS — C67.9 MALIGNANT NEOPLASM OF BLADDER, UNSPECIFIED: ICD-10-CM

## 2020-07-29 DIAGNOSIS — F17.210 NICOTINE DEPENDENCE, CIGARETTES, UNCOMPLICATED: ICD-10-CM

## 2020-07-29 DIAGNOSIS — N52.9 MALE ERECTILE DYSFUNCTION, UNSPECIFIED: ICD-10-CM

## 2020-07-29 PROCEDURE — 99214 OFFICE O/P EST MOD 30 MIN: CPT

## 2020-07-29 NOTE — PHYSICAL EXAM
[Normal Appearance] : normal appearance [General Appearance - In No Acute Distress] : no acute distress [] : no respiratory distress [Respiration, Rhythm And Depth] : normal respiratory rhythm and effort [Oriented To Time, Place, And Person] : oriented to person, place, and time [Normal Station and Gait] : the gait and station were normal for the patient's age

## 2020-07-29 NOTE — REVIEW OF SYSTEMS
[Feeling Tired] : not feeling tired [Feeling Poorly] : not feeling poorly [Nasal Discharge] : no nasal discharge [Chest Pain] : no chest pain [Cough] : no cough [Sore Throat] : no sore throat [Diarrhea] : no diarrhea [Constipation] : no constipation [Dysuria] : no dysuria [Confused] : no confusion

## 2020-07-29 NOTE — HISTORY OF PRESENT ILLNESS
[FreeTextEntry1] : 75-year-old 6 days post TURBT showing a solitary high-grade T1 lesion. Patient is urinating well without pain, no dysuria, no hematuria.\par \par He also has erectile dysfunction for which Cialis is no longer working

## 2020-07-29 NOTE — ASSESSMENT
[FreeTextEntry1] : Discussed fully with the patient pathology. Discussed expectant treatment, re re TUR, intravesical therapy, cystectomy. Patient would like to proceed with intravesical gemcitabine 6 weekly treatments then re re scope.\par \par Discuss options regarding erectile dysfunction including alternative phosphodiesterase inhibitors, vacuum constriction, prosthesis, injectables. We'll try Levitra

## 2020-07-31 ENCOUNTER — APPOINTMENT (OUTPATIENT)
Dept: HEMATOLOGY ONCOLOGY | Facility: CLINIC | Age: 75
End: 2020-07-31
Payer: COMMERCIAL

## 2020-07-31 ENCOUNTER — LABORATORY RESULT (OUTPATIENT)
Age: 75
End: 2020-07-31

## 2020-07-31 VITALS
BODY MASS INDEX: 32.76 KG/M2 | SYSTOLIC BLOOD PRESSURE: 115 MMHG | HEART RATE: 63 BPM | HEIGHT: 71 IN | RESPIRATION RATE: 16 BRPM | TEMPERATURE: 96.6 F | DIASTOLIC BLOOD PRESSURE: 57 MMHG | WEIGHT: 234 LBS

## 2020-07-31 LAB
HCT VFR BLD CALC: 36.9 %
HGB BLD-MCNC: 12.1 G/DL
MCHC RBC-ENTMCNC: 30.9 PG
MCHC RBC-ENTMCNC: 32.8 G/DL
MCV RBC AUTO: 94.4 FL
PLATELET # BLD AUTO: 164 K/UL
PMV BLD: 10.7 FL
RBC # BLD: 3.91 M/UL
RBC # FLD: 15.4 %
WBC # FLD AUTO: 6.78 K/UL

## 2020-07-31 PROCEDURE — 99204 OFFICE O/P NEW MOD 45 MIN: CPT

## 2020-07-31 NOTE — ASU PATIENT PROFILE, ADULT - CAREGIVER
Worsening status, previously did not tolerate dialysis and would not like it in the future. Nephrology consulted.  Palliative consult   Yes

## 2020-07-31 NOTE — CONSULT LETTER
[Consult Letter:] : I had the pleasure of evaluating your patient, [unfilled]. [Dear  ___] : Dear  [unfilled], [Consult Closing:] : Thank you very much for allowing me to participate in the care of this patient.  If you have any questions, please do not hesitate to contact me. [Please see my note below.] : Please see my note below. [Sincerely,] : Sincerely, [FreeTextEntry3] : Keshav Nails

## 2020-07-31 NOTE — ASSESSMENT
[FreeTextEntry1] : #High grade urothelial carcinoma \par - we explained that he is likely T1 staging and therefore bladder directed chemo is recommended\par - he is unsure if he has had dedicated abdominal imaging recently but notes he may have had a PET/CT back 6 months ago or so\par DISCUSSED W/ UROLOGY DR. FERNANDES: his team is working to start the therapy soon\par - followup with URO, Dr. Fernandes, regarding possibility of chemo into bladder\par - CBC, BMP, LFTs, iron studies, ferritin\par \par RTC in 5 weeks

## 2020-07-31 NOTE — REVIEW OF SYSTEMS
[Negative] : Allergic/Immunologic [Fever] : no fever [Night Sweats] : no night sweats [Chills] : no chills [Easy Bleeding] : no tendency for easy bleeding [Skin Rash] : no skin rash [Shortness Of Breath] : no shortness of breath

## 2020-07-31 NOTE — HISTORY OF PRESENT ILLNESS
[de-identified] : Mr. WENDY TATE is a 75 year old male here today for evaluation and management of Bladder Cancer.\par \par He was referred by Dr. Fernandes.  Wendy is a 74 yo M with PMHx including BPH, CAD and ED who presents to hospitals care.  He states that he was diagnosed with Bladder cancer originally in 2009.  He reports that he has had about 28 surgeries related to treatment of this .  No recent imaging available for review.  \par \par \par RADIOLOGIC WORKUP\par CTa Chest (1.5.2020) IMPRESSION: No CTA evidence of acute pulmonary embolus.Focal area of masslike consolidation in the right lower lobe measuring 1.7 cm and ground glass opacity within the left upper lobe measuring 1.1 cm. Consider short-term follow-up with CT chest to demonstrate resolution. \par CT A/P (7.28.2016)    IMPRESSION:1.  Proximal transverse colon bowel wall thickening. Given the history of abdominal pain and fever, findings are compatible with colitis, however underlying malignancy is a consideration and clinical correlation with patient's recent colonoscopy is recommended. 2.  3.3 x 2.9 cm pancreatic tail mass. Further evaluation with nonemergent MRI pancreatic protocol is recommended. 3.  Moderate-sized hiatal hernia.\par \par LAB WORKUP\par (7.10.2020) WBC 6.84, Hgb 11.3, MCV 95.6, RDW 16.1, , BUN 38, Cr 1.4, eGFR 48, Hematuria (-)\par \par PATHOLOGY\par (7.23.2020) Final Diagnosis\par Bladder tumor, superior wall, transurethral resection:\par -  High grade urothelial carcinoma invasive into the lamina propria.\par -  Tumor extends into adipose tissue of deep lamina propria.\par -  Rare focus of lymphovascular invasion is identified.\par -  Rare minute fragments of smooth muscle associated with adipose\par tissue, hypertrophic muscularis mucosae versus superficial\par fragment of muscularis propria, not involved by tumor.  See note.\par \par Note:  Adipose tissue is often present within deep lamina propria\par as small localized aggregates and is always found within\par muscularis propria.  Clinico-pathologic correlation is\par recommended.  Dr. CHARY Fernandes informed on 07/27/20 14:00.\par \par See synoptic summary.  This case received intradepartmental\par review, with a\par consensus diagnosis.\par \par Verified by: Petra Galdamez M.D.\par (Electronic Signature)\par Reported on: 07/27/20 14:17 EDT, [address] [place] [id number]\par Histology technical processing performed at [address] [place] [address],\[place] [address]\par Phone: [phone]   Fax: [phone]\par _________________________________________________________________\par \par Comment\par Case reported to Tumor Registry.\par \par Synoptic Summary\par SURGICAL PATHOLOGY CANCER CASE SUMMARY FOR URINARY BLADDER\par \par PROCEDURE:  Transurethral Resection of  Bladder Tumor (TURBT)\par TUMOR SITE:  Superior wall\par HISTOLOGIC TYPE:  Urothelial carcinoma, invasive\par HISTOLOGIC GRADE:  High grade\par TUMOR CONFIGURATION:  Solid/ nodular\par MUSCULARIS PROPRIA PRESENCE:  Difficult to determine - rare\par minute fragments of smooth muscle associated with adipose tissue,\par hypertrophic muscularis mucosae versus superficial fragments of\par muscularis propria\par LYMPHOVASCULAR INVASION:  Present\par TUMOR EXTENSION:  Tumor invades lamina propria (subepithelial\par connective tissue).\par \par Clinical History\par Cysto, TURBT\par Covid negative\par \par Specimen(s) Submitted\par Bladder tumor superior bladder wall\par \par Gross Description\par The specimen is received in formalin, labeled "bladder tumor\par superior bladder wall" and consists of multiple fragments of tan\par soft tissue measuring 1.1 x 0.3 x 0.2 cm in aggregate. The\par specimen is submitted entirely.  (1 block)\par \par Specimen was received and underwent gross examination at [facility], [address] [place] [address].\par \par 07/23/20 14:18 ns\par \par Perioperative Diagnosis\par Bladder cancer

## 2020-08-03 LAB
ALBUMIN SERPL ELPH-MCNC: 4.7 G/DL
ALP BLD-CCNC: 68 U/L
ALT SERPL-CCNC: 17 U/L
ANION GAP SERPL CALC-SCNC: 13 MMOL/L
AST SERPL-CCNC: 37 U/L
BILIRUB DIRECT SERPL-MCNC: <0.2 MG/DL
BILIRUB INDIRECT SERPL-MCNC: >0.1 MG/DL
BILIRUB SERPL-MCNC: 0.3 MG/DL
BUN SERPL-MCNC: 47 MG/DL
CALCIUM SERPL-MCNC: 9.5 MG/DL
CHLORIDE SERPL-SCNC: 103 MMOL/L
CO2 SERPL-SCNC: 23 MMOL/L
CREAT SERPL-MCNC: 1.8 MG/DL
FERRITIN SERPL-MCNC: 159 NG/ML
GLUCOSE SERPL-MCNC: 118 MG/DL
IRON SATN MFR SERPL: 37 %
IRON SERPL-MCNC: 104 UG/DL
POTASSIUM SERPL-SCNC: 4.4 MMOL/L
PROT SERPL-MCNC: 7.6 G/DL
SODIUM SERPL-SCNC: 139 MMOL/L
TIBC SERPL-MCNC: 281 UG/DL
UIBC SERPL-MCNC: 177 UG/DL

## 2020-08-10 ENCOUNTER — APPOINTMENT (OUTPATIENT)
Dept: UROLOGY | Facility: CLINIC | Age: 75
End: 2020-08-10
Payer: COMMERCIAL

## 2020-08-10 ENCOUNTER — LABORATORY RESULT (OUTPATIENT)
Age: 75
End: 2020-08-10

## 2020-08-10 VITALS
BODY MASS INDEX: 33.6 KG/M2 | WEIGHT: 240 LBS | HEIGHT: 71 IN | TEMPERATURE: 98.1 F | SYSTOLIC BLOOD PRESSURE: 119 MMHG | DIASTOLIC BLOOD PRESSURE: 57 MMHG | HEART RATE: 57 BPM

## 2020-08-10 PROCEDURE — 99214 OFFICE O/P EST MOD 30 MIN: CPT

## 2020-08-10 RX ORDER — TADALAFIL 20 MG/1
20 TABLET ORAL
Qty: 6 | Refills: 5 | Status: COMPLETED | COMMUNITY
Start: 2018-12-21 | End: 2020-08-10

## 2020-08-10 RX ORDER — TADALAFIL 20 MG/1
20 TABLET ORAL
Qty: 6 | Refills: 5 | Status: COMPLETED | COMMUNITY
Start: 2019-03-27 | End: 2020-08-10

## 2020-08-10 RX ORDER — CEPHALEXIN 500 MG/1
500 CAPSULE ORAL 4 TIMES DAILY
Qty: 28 | Refills: 0 | Status: COMPLETED | COMMUNITY
Start: 2018-03-16 | End: 2020-08-10

## 2020-08-10 RX ORDER — CIPROFLOXACIN HYDROCHLORIDE 500 MG/1
500 TABLET, FILM COATED ORAL TWICE DAILY
Qty: 6 | Refills: 0 | Status: COMPLETED | COMMUNITY
Start: 2018-02-05 | End: 2020-08-10

## 2020-08-10 RX ORDER — CIPROFLOXACIN HYDROCHLORIDE 500 MG/1
500 TABLET, FILM COATED ORAL
Qty: 6 | Refills: 0 | Status: COMPLETED | COMMUNITY
Start: 2017-11-08 | End: 2020-08-10

## 2020-08-10 RX ORDER — VARDENAFIL 20 MG/1
20 TABLET, FILM COATED ORAL
Qty: 6 | Refills: 5 | Status: COMPLETED | COMMUNITY
Start: 2020-07-29 | End: 2020-08-10

## 2020-08-10 RX ORDER — LEVOFLOXACIN 500 MG/1
500 TABLET, FILM COATED ORAL
Qty: 10 | Refills: 0 | Status: COMPLETED | COMMUNITY
Start: 2018-02-06 | End: 2020-08-10

## 2020-08-12 NOTE — LETTER HEADER
[FreeTextEntry3] : Flora Nava M.D.\par Director of Urology\par [facility]\par [address] [place] [address], [facility] [address]\[place] [address]\par \par for\par  \par Dr. Fernandes

## 2020-08-12 NOTE — LETTER BODY
[Dear  ___] : Dear  [unfilled], [Please see my note below.] : Please see my note below. [Courtesy Letter:] : I had the pleasure of seeing your patient, [unfilled], in my office today. [Sincerely,] : Sincerely, [FreeTextEntry2] : Dr. Yousuf Velazquez\par [address]\par [place] [id number]

## 2020-08-12 NOTE — ASSESSMENT
[FreeTextEntry1] : This procedure was on 7/23 and has been voiding well without incident. About 4 days ago he noted some worsening urinary frequency with left lower quadrant discomfort. He started taking Macrobid p.o. b.i.d., without much change. His pain is relieved with defecation and he has history of diverticulosis. Urine dip today is positive for leukocytes and protein  negative for blood or nitrites.\par \par UA C&S will be sent from the office we recommend followup with his PCP ASAP to rule out diverticulitis

## 2020-08-12 NOTE — PHYSICAL EXAM
[General Appearance - Well Developed] : well developed [General Appearance - Well Nourished] : well nourished [Normal Appearance] : normal appearance [Well Groomed] : well groomed [General Appearance - In No Acute Distress] : no acute distress [Abdomen Soft] : soft [Costovertebral Angle Tenderness] : no ~M costovertebral angle tenderness [Urethral Meatus] : meatus normal [Penis Abnormality] : normal circumcised penis [Urinary Bladder Findings] : the bladder was normal on palpation [Scrotum] : the scrotum was normal [Testes Tenderness] : no tenderness of the testes [Testes Mass (___cm)] : there were no testicular masses [] : no respiratory distress [Exaggerated Use Of Accessory Muscles For Inspiration] : no accessory muscle use [Respiration, Rhythm And Depth] : normal respiratory rhythm and effort [Oriented To Time, Place, And Person] : oriented to person, place, and time [Affect] : the affect was normal [Mood] : the mood was normal [Not Anxious] : not anxious [Normal Station and Gait] : the gait and station were normal for the patient's age [No Focal Deficits] : no focal deficits [FreeTextEntry1] : Bilateral lower extremity edema

## 2020-08-12 NOTE — HISTORY OF PRESENT ILLNESS
[Urinary Frequency] : urinary frequency [Lower Abdomen] : lower abdomen [2] : 2 [Dull] : dull [Left Lower Back] : left lower back [Aching] : aching [Constant] : constantly [Mild] : mild [Unchanged] : unchanged [None] : No exacerbating factors are noted [FreeTextEntry1] : Biju is a 75-year-old male, with a history of high-grade T1 bladder cancer, status post TURBT of a small superior lesion on 7/23/20. His catheter was removed on 7/24/20 without complication.\par \par He presents to the office today, unexpectedly, complaining of lower left quadrant discomfort which radiates to his back x 4 days. At the same time he noted slightly worsening urinary frequency. He self started Macrobid 100 mg p.o. b.i.d. the first day the symptoms occurred. His pain is relieved with defecation and is unchanged with urination.\par \par He denies dysuria, gross hematuria, abdominal/flank pain, suprapubic discomfort, nausea, vomiting, chills, fever, lightheadedness, dizziness, further urological/constitutional symptoms.\par \par Of note patient has history of diverticulosis. [Dysuria] : no dysuria [Hematuria - Gross] : no gross hematuria [de-identified] : defecation

## 2020-08-13 LAB
APPEARANCE: CLEAR
BACTERIA UR CULT: NORMAL
BILIRUBIN URINE: NEGATIVE
BLOOD URINE: NORMAL
COLOR: YELLOW
GLUCOSE QUALITATIVE U: NEGATIVE
KETONES URINE: NEGATIVE
LEUKOCYTE ESTERASE URINE: ABNORMAL
NITRITE URINE: NEGATIVE
PH URINE: 6
PROTEIN URINE: ABNORMAL
SPECIFIC GRAVITY URINE: 1.02
UROBILINOGEN URINE: NORMAL

## 2020-08-21 ENCOUNTER — LABORATORY RESULT (OUTPATIENT)
Age: 75
End: 2020-08-21

## 2020-08-21 ENCOUNTER — OUTPATIENT (OUTPATIENT)
Dept: OUTPATIENT SERVICES | Facility: HOSPITAL | Age: 75
LOS: 1 days | Discharge: HOME | End: 2020-08-21

## 2020-08-21 DIAGNOSIS — Z98.890 OTHER SPECIFIED POSTPROCEDURAL STATES: Chronic | ICD-10-CM

## 2020-08-21 DIAGNOSIS — Z11.59 ENCOUNTER FOR SCREENING FOR OTHER VIRAL DISEASES: ICD-10-CM

## 2020-08-23 LAB — BACTERIA UR CULT: NORMAL

## 2020-08-26 ENCOUNTER — APPOINTMENT (OUTPATIENT)
Dept: UROLOGY | Facility: CLINIC | Age: 75
End: 2020-08-26
Payer: MEDICARE

## 2020-08-26 ENCOUNTER — OUTPATIENT (OUTPATIENT)
Dept: OUTPATIENT SERVICES | Facility: HOSPITAL | Age: 75
LOS: 1 days | Discharge: HOME | End: 2020-08-26

## 2020-08-26 VITALS
DIASTOLIC BLOOD PRESSURE: 66 MMHG | HEART RATE: 56 BPM | RESPIRATION RATE: 14 BRPM | HEIGHT: 71 IN | SYSTOLIC BLOOD PRESSURE: 121 MMHG | BODY MASS INDEX: 33.32 KG/M2 | WEIGHT: 238 LBS | TEMPERATURE: 97.3 F

## 2020-08-26 DIAGNOSIS — Z98.890 OTHER SPECIFIED POSTPROCEDURAL STATES: Chronic | ICD-10-CM

## 2020-08-26 PROCEDURE — 51720 TREATMENT OF BLADDER LESION: CPT

## 2020-08-26 RX ORDER — NITROFURANTOIN (MONOHYDRATE/MACROCRYSTALS) 25; 75 MG/1; MG/1
100 CAPSULE ORAL
Qty: 28 | Refills: 2 | Status: COMPLETED | COMMUNITY
Start: 2018-07-03 | End: 2020-08-26

## 2020-08-26 RX ORDER — NITROFURANTOIN (MONOHYDRATE/MACROCRYSTALS) 25; 75 MG/1; MG/1
100 CAPSULE ORAL
Qty: 28 | Refills: 2 | Status: COMPLETED | COMMUNITY
Start: 2018-10-09 | End: 2020-08-26

## 2020-08-26 RX ORDER — NITROFURANTOIN (MONOHYDRATE/MACROCRYSTALS) 25; 75 MG/1; MG/1
100 CAPSULE ORAL TWICE DAILY
Qty: 6 | Refills: 0 | Status: COMPLETED | COMMUNITY
Start: 2019-03-27 | End: 2020-08-26

## 2020-08-26 RX ORDER — NITROFURANTOIN (MONOHYDRATE/MACROCRYSTALS) 25; 75 MG/1; MG/1
100 CAPSULE ORAL
Qty: 28 | Refills: 0 | Status: COMPLETED | COMMUNITY
Start: 2018-03-19 | End: 2020-08-26

## 2020-08-26 RX ORDER — KETOCONAZOLE 20 MG/G
2 CREAM TOPICAL TWICE DAILY
Qty: 60 | Refills: 1 | Status: COMPLETED | COMMUNITY
Start: 2018-02-02 | End: 2020-08-26

## 2020-08-26 RX ORDER — NITROFURANTOIN MACROCRYSTALS 100 MG/1
100 CAPSULE ORAL
Qty: 6 | Refills: 0 | Status: COMPLETED | COMMUNITY
Start: 2019-06-28 | End: 2020-08-26

## 2020-08-26 RX ORDER — GEMCITABINE 38 MG/ML
2000 INJECTION, SOLUTION INTRAVENOUS ONCE
Refills: 0 | Status: DISCONTINUED | OUTPATIENT
Start: 2020-08-26 | End: 2020-11-29

## 2020-08-26 RX ORDER — NITROFURANTOIN MACROCRYSTALS 100 MG/1
100 CAPSULE ORAL
Qty: 6 | Refills: 0 | Status: COMPLETED | COMMUNITY
Start: 2020-06-17 | End: 2020-08-26

## 2020-08-26 RX ORDER — TAMSULOSIN HYDROCHLORIDE 0.4 MG/1
0.4 CAPSULE ORAL
Qty: 90 | Refills: 0 | Status: COMPLETED | COMMUNITY
Start: 2017-01-18 | End: 2020-08-26

## 2020-08-26 RX ORDER — CANDESARTAN CILEXETIL AND HYDROCHLOROTHIAZIDE 16; 12.5 MG/1; MG/1
16-12.5 TABLET ORAL
Qty: 90 | Refills: 0 | Status: COMPLETED | COMMUNITY
Start: 2017-11-08 | End: 2020-08-26

## 2020-08-26 RX ORDER — NITROFURANTOIN (MONOHYDRATE/MACROCRYSTALS) 25; 75 MG/1; MG/1
100 CAPSULE ORAL
Qty: 6 | Refills: 0 | Status: COMPLETED | COMMUNITY
Start: 2020-02-19 | End: 2020-08-26

## 2020-08-26 RX ORDER — TAMSULOSIN HYDROCHLORIDE 0.4 MG/1
0.4 CAPSULE ORAL
Qty: 90 | Refills: 3 | Status: COMPLETED | COMMUNITY
Start: 2018-02-05 | End: 2020-08-26

## 2020-08-26 RX ORDER — NITROFURANTOIN (MONOHYDRATE/MACROCRYSTALS) 25; 75 MG/1; MG/1
100 CAPSULE ORAL
Qty: 28 | Refills: 0 | Status: COMPLETED | COMMUNITY
Start: 2018-05-02 | End: 2020-08-26

## 2020-08-26 RX ORDER — NITROFURANTOIN (MONOHYDRATE/MACROCRYSTALS) 25; 75 MG/1; MG/1
100 CAPSULE ORAL
Qty: 28 | Refills: 0 | Status: COMPLETED | COMMUNITY
Start: 2018-02-13 | End: 2020-08-26

## 2020-08-26 RX ORDER — NITROFURANTOIN (MONOHYDRATE/MACROCRYSTALS) 25; 75 MG/1; MG/1
100 CAPSULE ORAL
Qty: 28 | Refills: 0 | Status: COMPLETED | COMMUNITY
Start: 2018-04-16 | End: 2020-08-26

## 2020-08-27 ENCOUNTER — LABORATORY RESULT (OUTPATIENT)
Age: 75
End: 2020-08-27

## 2020-08-27 ENCOUNTER — APPOINTMENT (OUTPATIENT)
Dept: UROLOGY | Facility: CLINIC | Age: 75
End: 2020-08-27
Payer: MEDICARE

## 2020-08-27 VITALS — TEMPERATURE: 98.6 F | HEIGHT: 71 IN | WEIGHT: 234 LBS | BODY MASS INDEX: 32.76 KG/M2

## 2020-08-27 VITALS — SYSTOLIC BLOOD PRESSURE: 114 MMHG | DIASTOLIC BLOOD PRESSURE: 68 MMHG | HEART RATE: 67 BPM

## 2020-08-27 DIAGNOSIS — Z87.440 PERSONAL HISTORY OF URINARY (TRACT) INFECTIONS: ICD-10-CM

## 2020-08-27 DIAGNOSIS — N32.81 OVERACTIVE BLADDER: ICD-10-CM

## 2020-08-27 LAB
BILIRUB UR QL STRIP: NORMAL
CLARITY UR: CLEAR
COLLECTION METHOD: NORMAL
GLUCOSE UR-MCNC: NORMAL
HCG UR QL: NORMAL EU/DL
HGB UR QL STRIP.AUTO: 50
KETONES UR-MCNC: NORMAL
LEUKOCYTE ESTERASE UR QL STRIP: 25
NITRITE UR QL STRIP: NORMAL
PH UR STRIP: 5
PROT UR STRIP-MCNC: NORMAL
SP GR UR STRIP: 1.01

## 2020-08-27 PROCEDURE — 51798 US URINE CAPACITY MEASURE: CPT

## 2020-08-27 PROCEDURE — 81003 URINALYSIS AUTO W/O SCOPE: CPT | Mod: QW

## 2020-08-27 PROCEDURE — 99213 OFFICE O/P EST LOW 20 MIN: CPT

## 2020-08-27 RX ORDER — CEPHALEXIN 250 MG/1
250 CAPSULE ORAL
Qty: 12 | Refills: 0 | Status: DISCONTINUED | COMMUNITY
Start: 2020-07-23

## 2020-08-27 RX ORDER — AZITHROMYCIN 250 MG/1
250 TABLET, FILM COATED ORAL
Qty: 6 | Refills: 0 | Status: DISCONTINUED | COMMUNITY
Start: 2020-07-15

## 2020-08-27 RX ORDER — FUROSEMIDE 20 MG/1
20 TABLET ORAL
Qty: 30 | Refills: 0 | Status: DISCONTINUED | COMMUNITY
Start: 2020-05-04

## 2020-08-27 NOTE — HISTORY OF PRESENT ILLNESS
[FreeTextEntry1] : Biju is a 75-year-old with history of high-grade T1 bladder cancer, status post TURBT of a small superior lesion on 7/23/20. He status post successful trial of void on 7/24/20.\par \par He presents today unexpectedly complaining of worsening frequency/urgency and clot passage post gemcitabine instillation on 8/26/20.\par \par He denies feelings of incomplete bladder emptying however, he is having severe frequency/urgency.\par \par He presents today for PVR and urine testing. [Urinary Urgency] : urinary urgency [Urinary Frequency] : urinary frequency

## 2020-08-27 NOTE — LETTER HEADER
[FreeTextEntry3] : Flora Nava M.D.\par Director of Urology\par [facility]\par [address] [place] [address], [facility] [address]\[place] [address]\par \par for\par  \par Dr. Fernandes

## 2020-08-27 NOTE — LETTER BODY
[Dear  ___] : Dear  [unfilled], [Courtesy Letter:] : I had the pleasure of seeing your patient, [unfilled], in my office today. [Please see my note below.] : Please see my note below. [Sincerely,] : Sincerely, [FreeTextEntry2] : Dr. Yousuf Velazquez\par [address]\par [place] [id number]

## 2020-08-27 NOTE — ADDENDUM
[FreeTextEntry1] : I was in the suite and immediately available during the whole patient encounter and review the issues prior to the patient leaving.\par

## 2020-08-27 NOTE — ASSESSMENT
[FreeTextEntry1] : He is having severe irritative symptoms post gemcitabine. PVR is 52 and urine dip is negative. UA C&S will be sent from the office today and explained that he could have severe urinary symptoms post gemcitabine induction. We'll continue to increase p.o. fluid and we will contact him with results. Continue appointment for gemcitabine\par \par We explained that one of the reactions we look for with this drug is to irritate the bladder to cause an inflammatory/immune response to take care of any cancer cells that might be left behind that we can see. He should look at the symptoms as a positive though irritating not a negative

## 2020-08-27 NOTE — PHYSICAL EXAM
[General Appearance - Well Developed] : well developed [General Appearance - Well Nourished] : well nourished [Normal Appearance] : normal appearance [Well Groomed] : well groomed [General Appearance - In No Acute Distress] : no acute distress [Abdomen Soft] : soft [Abdomen Tenderness] : non-tender [Costovertebral Angle Tenderness] : no ~M costovertebral angle tenderness [Edema] : no peripheral edema [] : no respiratory distress [Respiration, Rhythm And Depth] : normal respiratory rhythm and effort [Exaggerated Use Of Accessory Muscles For Inspiration] : no accessory muscle use [Oriented To Time, Place, And Person] : oriented to person, place, and time [Affect] : the affect was normal [Mood] : the mood was normal [Not Anxious] : not anxious [Normal Station and Gait] : the gait and station were normal for the patient's age [No Focal Deficits] : no focal deficits [FreeTextEntry1] : Obese

## 2020-08-28 ENCOUNTER — OUTPATIENT (OUTPATIENT)
Dept: OUTPATIENT SERVICES | Facility: HOSPITAL | Age: 75
LOS: 1 days | Discharge: HOME | End: 2020-08-28

## 2020-08-28 ENCOUNTER — APPOINTMENT (OUTPATIENT)
Dept: HEMATOLOGY ONCOLOGY | Facility: CLINIC | Age: 75
End: 2020-08-28
Payer: MEDICARE

## 2020-08-28 VITALS
HEIGHT: 71 IN | BODY MASS INDEX: 32.62 KG/M2 | HEART RATE: 56 BPM | DIASTOLIC BLOOD PRESSURE: 77 MMHG | WEIGHT: 233 LBS | RESPIRATION RATE: 14 BRPM | SYSTOLIC BLOOD PRESSURE: 117 MMHG | TEMPERATURE: 98.4 F

## 2020-08-28 DIAGNOSIS — Z98.890 OTHER SPECIFIED POSTPROCEDURAL STATES: Chronic | ICD-10-CM

## 2020-08-28 DIAGNOSIS — C67.9 MALIGNANT NEOPLASM OF BLADDER, UNSPECIFIED: ICD-10-CM

## 2020-08-28 PROCEDURE — 99213 OFFICE O/P EST LOW 20 MIN: CPT

## 2020-08-28 NOTE — HISTORY OF PRESENT ILLNESS
[de-identified] : Mr. WENDY TATE is a 75 year old male here today for evaluation and management of Bladder Cancer.\par \par He was referred by Dr. Fernandes.  Wendy is a 74 yo M with PMHx including BPH, CAD and ED who presents to [facility] care.  He states that he was diagnosed with Bladder cancer originally in 2009.  He reports that he has had about 28 surgeries related to treatment of this .  No recent imaging available for review.  \par \par \par RADIOLOGIC WORKUP\par CTa Chest (1.5.2020) IMPRESSION: No CTA evidence of acute pulmonary embolus.Focal area of masslike consolidation in the right lower lobe measuring 1.7 cm and ground glass opacity within the left upper lobe measuring 1.1 cm. Consider short-term follow-up with CT chest to demonstrate resolution. \par CT A/P (7.28.2016)    IMPRESSION:1.  Proximal transverse colon bowel wall thickening. Given the history of abdominal pain and fever, findings are compatible with colitis, however underlying malignancy is a consideration and clinical correlation with patient's recent colonoscopy is recommended. 2.  3.3 x 2.9 cm pancreatic tail mass. Further evaluation with nonemergent MRI pancreatic protocol is recommended. 3.  Moderate-sized hiatal hernia.\par \par LAB WORKUP\par (7.10.2020) WBC 6.84, Hgb 11.3, MCV 95.6, RDW 16.1, , BUN 38, Cr 1.4, eGFR 48, Hematuria (-)\par \par PATHOLOGY\par (7.23.2020) Final Diagnosis\par Bladder tumor, superior wall, transurethral resection:\par -  High grade urothelial carcinoma invasive into the lamina propria.\par -  Tumor extends into adipose tissue of deep lamina propria.\par -  Rare focus of lymphovascular invasion is identified.\par -  Rare minute fragments of smooth muscle associated with adipose\par tissue, hypertrophic muscularis mucosae versus superficial\par fragment of muscularis propria, not involved by tumor.  See note.\par \par Note:  Adipose tissue is often present within deep lamina propria\par as small localized aggregates and is always found within\par muscularis propria.  Clinico-pathologic correlation is\par recommended.  Dr. CHARY Fernandes informed on 07/27/20 14:00.\par \par See synoptic summary.  This case received intradepartmental\par review, with a\par consensus diagnosis.\par \par Verified by: Petra Galdamez M.D.\par (Electronic Signature)\par Reported on: 07/27/20 14:17 EDT, [address] [place] [id number]\par Histology technical processing performed at [address] [place] [address],\[place] [address]\par Phone: [phone]   Fax: [phone]\par _________________________________________________________________\par \par Comment\par Case reported to Tumor Registry.\par \par Synoptic Summary\par SURGICAL PATHOLOGY CANCER CASE SUMMARY FOR URINARY BLADDER\par \par PROCEDURE:  Transurethral Resection of  Bladder Tumor (TURBT)\par TUMOR SITE:  Superior wall\par HISTOLOGIC TYPE:  Urothelial carcinoma, invasive\par HISTOLOGIC GRADE:  High grade\par TUMOR CONFIGURATION:  Solid/ nodular\par MUSCULARIS PROPRIA PRESENCE:  Difficult to determine - rare\par minute fragments of smooth muscle associated with adipose tissue,\par hypertrophic muscularis mucosae versus superficial fragments of\par muscularis propria\par LYMPHOVASCULAR INVASION:  Present\par TUMOR EXTENSION:  Tumor invades lamina propria (subepithelial\par connective tissue).\par \par Clinical History\par Cysto, TURBT\par Covid negative\par \par Specimen(s) Submitted\par Bladder tumor superior bladder wall\par \par Gross Description\par The specimen is received in formalin, labeled "bladder tumor\par superior bladder wall" and consists of multiple fragments of tan\par soft tissue measuring 1.1 x 0.3 x 0.2 cm in aggregate. The\par specimen is submitted entirely.  (1 block)\par \par Specimen was received and underwent gross examination at [facility], [address] [place] [address].\par \par 07/23/20 14:18 ns\par \par Perioperative Diagnosis\par Bladder cancer [de-identified] : 8/28/2020\par Patient is here for a follow-up visit for bladder cancer, followed by URO, DR. Nava.  He is feeling well with no new complaints.  Patient denies fever, chills, nausea, vomiting, or hematuria.  He does report urinary frequency for the last 6 weeks or so.  He last had cystoscopy about 5 weeks ago with Dr. Fernandes.  He is s/p induction bladder chemo, first dose given last week.  He reports he was unable to retain chemo in bladder for full duration.

## 2020-08-28 NOTE — ASSESSMENT
[FreeTextEntry1] : #High grade urothelial carcinoma \par - we explained that he is likely T1 staging and therefore bladder directed chemo is recommended\par - reports he has had dedicated abdominal imaging to r/o diverticulitis recently, requested records\par - followup with URO, Dr. Fernandes, for continuation of weekly bladder directed chemo (gemcitabine)\par - he follows with NEPRHOLOGY regularly for renal dysfunction\par \par RTC as needed for now, unless otherwise indicated by URO or other specialty

## 2020-08-28 NOTE — REVIEW OF SYSTEMS
[Negative] : Allergic/Immunologic [Fever] : no fever [Chills] : no chills [Night Sweats] : no night sweats [Shortness Of Breath] : no shortness of breath [Skin Rash] : no skin rash [Easy Bleeding] : no tendency for easy bleeding [FreeTextEntry8] : reports urinary frequency x 6 weeks, passed 1 clot after gemcitabine tx but no bleeding

## 2020-08-28 NOTE — CONSULT LETTER
[Dear  ___] : Dear  [unfilled], [Consult Letter:] : I had the pleasure of evaluating your patient, [unfilled]. [Please see my note below.] : Please see my note below. [Sincerely,] : Sincerely, [Consult Closing:] : Thank you very much for allowing me to participate in the care of this patient.  If you have any questions, please do not hesitate to contact me. [FreeTextEntry3] : Keshav Nails

## 2020-08-31 LAB
APPEARANCE: CLEAR
BACTERIA UR CULT: NORMAL
BILIRUBIN URINE: NEGATIVE
BLOOD URINE: ABNORMAL
COLOR: YELLOW
GLUCOSE QUALITATIVE U: NEGATIVE
KETONES URINE: NEGATIVE
LEUKOCYTE ESTERASE URINE: ABNORMAL
NITRITE URINE: NEGATIVE
PH URINE: 6
PROTEIN URINE: ABNORMAL
SPECIFIC GRAVITY URINE: 1.02
UROBILINOGEN URINE: NORMAL

## 2020-09-09 ENCOUNTER — APPOINTMENT (OUTPATIENT)
Dept: UROLOGY | Facility: CLINIC | Age: 75
End: 2020-09-09
Payer: MEDICARE

## 2020-09-09 ENCOUNTER — OUTPATIENT (OUTPATIENT)
Dept: OUTPATIENT SERVICES | Facility: HOSPITAL | Age: 75
LOS: 1 days | Discharge: HOME | End: 2020-09-09

## 2020-09-09 VITALS
TEMPERATURE: 97.3 F | HEART RATE: 60 BPM | HEIGHT: 71 IN | DIASTOLIC BLOOD PRESSURE: 65 MMHG | SYSTOLIC BLOOD PRESSURE: 106 MMHG | WEIGHT: 234 LBS | RESPIRATION RATE: 14 BRPM | BODY MASS INDEX: 32.76 KG/M2

## 2020-09-09 DIAGNOSIS — Z98.890 OTHER SPECIFIED POSTPROCEDURAL STATES: Chronic | ICD-10-CM

## 2020-09-09 PROCEDURE — 51720 TREATMENT OF BLADDER LESION: CPT

## 2020-09-09 RX ORDER — GEMCITABINE 38 MG/ML
2000 INJECTION, SOLUTION INTRAVENOUS ONCE
Refills: 0 | Status: DISCONTINUED | OUTPATIENT
Start: 2020-09-09 | End: 2020-12-13

## 2020-09-10 DIAGNOSIS — C67.9 MALIGNANT NEOPLASM OF BLADDER, UNSPECIFIED: ICD-10-CM

## 2020-09-16 ENCOUNTER — OUTPATIENT (OUTPATIENT)
Dept: OUTPATIENT SERVICES | Facility: HOSPITAL | Age: 75
LOS: 1 days | Discharge: HOME | End: 2020-09-16

## 2020-09-16 ENCOUNTER — APPOINTMENT (OUTPATIENT)
Dept: UROLOGY | Facility: CLINIC | Age: 75
End: 2020-09-16
Payer: COMMERCIAL

## 2020-09-16 VITALS
HEIGHT: 71 IN | BODY MASS INDEX: 32.76 KG/M2 | RESPIRATION RATE: 14 BRPM | TEMPERATURE: 97.6 F | DIASTOLIC BLOOD PRESSURE: 59 MMHG | HEART RATE: 66 BPM | SYSTOLIC BLOOD PRESSURE: 115 MMHG | WEIGHT: 234 LBS

## 2020-09-16 DIAGNOSIS — Z98.890 OTHER SPECIFIED POSTPROCEDURAL STATES: Chronic | ICD-10-CM

## 2020-09-16 PROCEDURE — 51720 TREATMENT OF BLADDER LESION: CPT

## 2020-09-16 RX ORDER — GEMCITABINE 38 MG/ML
2000 INJECTION, SOLUTION INTRAVENOUS ONCE
Refills: 0 | Status: DISCONTINUED | OUTPATIENT
Start: 2020-09-16 | End: 2020-12-20

## 2020-09-17 DIAGNOSIS — C67.9 MALIGNANT NEOPLASM OF BLADDER, UNSPECIFIED: ICD-10-CM

## 2020-09-23 ENCOUNTER — APPOINTMENT (OUTPATIENT)
Dept: UROLOGY | Facility: CLINIC | Age: 75
End: 2020-09-23
Payer: COMMERCIAL

## 2020-09-23 ENCOUNTER — OUTPATIENT (OUTPATIENT)
Dept: OUTPATIENT SERVICES | Facility: HOSPITAL | Age: 75
LOS: 1 days | Discharge: HOME | End: 2020-09-23

## 2020-09-23 VITALS
WEIGHT: 234 LBS | DIASTOLIC BLOOD PRESSURE: 52 MMHG | SYSTOLIC BLOOD PRESSURE: 104 MMHG | HEART RATE: 60 BPM | RESPIRATION RATE: 14 BRPM | TEMPERATURE: 97.3 F | BODY MASS INDEX: 32.76 KG/M2 | HEIGHT: 71 IN

## 2020-09-23 DIAGNOSIS — Z98.890 OTHER SPECIFIED POSTPROCEDURAL STATES: Chronic | ICD-10-CM

## 2020-09-23 PROCEDURE — 51720 TREATMENT OF BLADDER LESION: CPT

## 2020-09-23 RX ORDER — GEMCITABINE 38 MG/ML
2000 INJECTION, SOLUTION INTRAVENOUS ONCE
Refills: 0 | Status: DISCONTINUED | OUTPATIENT
Start: 2020-09-23 | End: 2020-12-27

## 2020-09-24 DIAGNOSIS — C67.9 MALIGNANT NEOPLASM OF BLADDER, UNSPECIFIED: ICD-10-CM

## 2020-09-30 ENCOUNTER — APPOINTMENT (OUTPATIENT)
Dept: UROLOGY | Facility: CLINIC | Age: 75
End: 2020-09-30
Payer: COMMERCIAL

## 2020-09-30 ENCOUNTER — OUTPATIENT (OUTPATIENT)
Dept: OUTPATIENT SERVICES | Facility: HOSPITAL | Age: 75
LOS: 1 days | Discharge: HOME | End: 2020-09-30

## 2020-09-30 VITALS
HEIGHT: 71 IN | TEMPERATURE: 97.1 F | DIASTOLIC BLOOD PRESSURE: 56 MMHG | RESPIRATION RATE: 14 BRPM | SYSTOLIC BLOOD PRESSURE: 116 MMHG

## 2020-09-30 VITALS
SYSTOLIC BLOOD PRESSURE: 116 MMHG | HEART RATE: 56 BPM | RESPIRATION RATE: 14 BRPM | HEIGHT: 71 IN | WEIGHT: 234 LBS | DIASTOLIC BLOOD PRESSURE: 56 MMHG | BODY MASS INDEX: 32.76 KG/M2 | TEMPERATURE: 97.1 F

## 2020-09-30 DIAGNOSIS — Z98.890 OTHER SPECIFIED POSTPROCEDURAL STATES: Chronic | ICD-10-CM

## 2020-09-30 DIAGNOSIS — C67.9 MALIGNANT NEOPLASM OF BLADDER, UNSPECIFIED: ICD-10-CM

## 2020-09-30 PROCEDURE — 51720 TREATMENT OF BLADDER LESION: CPT

## 2020-09-30 RX ORDER — GEMCITABINE 38 MG/ML
2000 INJECTION, SOLUTION INTRAVENOUS ONCE
Refills: 0 | Status: DISCONTINUED | OUTPATIENT
Start: 2020-09-30 | End: 2021-01-03

## 2020-10-07 ENCOUNTER — OUTPATIENT (OUTPATIENT)
Dept: OUTPATIENT SERVICES | Facility: HOSPITAL | Age: 75
LOS: 1 days | Discharge: HOME | End: 2020-10-07

## 2020-10-07 ENCOUNTER — APPOINTMENT (OUTPATIENT)
Dept: UROLOGY | Facility: CLINIC | Age: 75
End: 2020-10-07
Payer: COMMERCIAL

## 2020-10-07 VITALS
RESPIRATION RATE: 14 BRPM | DIASTOLIC BLOOD PRESSURE: 56 MMHG | BODY MASS INDEX: 32.76 KG/M2 | HEIGHT: 71 IN | TEMPERATURE: 97.5 F | SYSTOLIC BLOOD PRESSURE: 115 MMHG | HEART RATE: 61 BPM | WEIGHT: 234 LBS

## 2020-10-07 DIAGNOSIS — Z98.890 OTHER SPECIFIED POSTPROCEDURAL STATES: Chronic | ICD-10-CM

## 2020-10-07 PROCEDURE — 51720 TREATMENT OF BLADDER LESION: CPT

## 2020-10-07 RX ORDER — GEMCITABINE 38 MG/ML
2000 INJECTION, SOLUTION INTRAVENOUS ONCE
Refills: 0 | Status: DISCONTINUED | OUTPATIENT
Start: 2020-10-07 | End: 2021-01-10

## 2020-10-09 DIAGNOSIS — C67.9 MALIGNANT NEOPLASM OF BLADDER, UNSPECIFIED: ICD-10-CM

## 2020-11-18 ENCOUNTER — APPOINTMENT (OUTPATIENT)
Dept: UROLOGY | Facility: CLINIC | Age: 75
End: 2020-11-18

## 2020-12-02 ENCOUNTER — APPOINTMENT (OUTPATIENT)
Dept: UROLOGY | Facility: CLINIC | Age: 75
End: 2020-12-02
Payer: MEDICARE

## 2020-12-02 VITALS — HEIGHT: 71 IN | WEIGHT: 234 LBS | BODY MASS INDEX: 32.76 KG/M2

## 2020-12-02 LAB
BILIRUB UR QL STRIP: NORMAL
CLARITY UR: CLEAR
COLLECTION METHOD: NORMAL
GLUCOSE UR-MCNC: NORMAL
HCG UR QL: NORMAL EU/DL
HGB UR QL STRIP.AUTO: NORMAL
KETONES UR-MCNC: NORMAL
LEUKOCYTE ESTERASE UR QL STRIP: NORMAL
NITRITE UR QL STRIP: NORMAL
PH UR STRIP: 5
PROT UR STRIP-MCNC: 30
SP GR UR STRIP: 1.02

## 2020-12-02 PROCEDURE — 52000 CYSTOURETHROSCOPY: CPT

## 2020-12-02 PROCEDURE — 81003 URINALYSIS AUTO W/O SCOPE: CPT | Mod: QW

## 2020-12-09 ENCOUNTER — OUTPATIENT (OUTPATIENT)
Dept: OUTPATIENT SERVICES | Facility: HOSPITAL | Age: 75
LOS: 1 days | Discharge: HOME | End: 2020-12-09

## 2020-12-09 VITALS
HEIGHT: 70 IN | HEART RATE: 51 BPM | RESPIRATION RATE: 18 BRPM | SYSTOLIC BLOOD PRESSURE: 133 MMHG | WEIGHT: 233.91 LBS | DIASTOLIC BLOOD PRESSURE: 71 MMHG | TEMPERATURE: 97 F | OXYGEN SATURATION: 99 %

## 2020-12-09 DIAGNOSIS — Z01.818 ENCOUNTER FOR OTHER PREPROCEDURAL EXAMINATION: ICD-10-CM

## 2020-12-09 DIAGNOSIS — C67.9 MALIGNANT NEOPLASM OF BLADDER, UNSPECIFIED: ICD-10-CM

## 2020-12-09 DIAGNOSIS — Z98.890 OTHER SPECIFIED POSTPROCEDURAL STATES: Chronic | ICD-10-CM

## 2020-12-09 LAB
ALBUMIN SERPL ELPH-MCNC: 4.3 G/DL — SIGNIFICANT CHANGE UP (ref 3.5–5.2)
ALP SERPL-CCNC: 73 U/L — SIGNIFICANT CHANGE UP (ref 30–115)
ALT FLD-CCNC: 22 U/L — SIGNIFICANT CHANGE UP (ref 0–41)
ANION GAP SERPL CALC-SCNC: 10 MMOL/L — SIGNIFICANT CHANGE UP (ref 7–14)
APPEARANCE UR: CLEAR — SIGNIFICANT CHANGE UP
APTT BLD: 30.5 SEC — SIGNIFICANT CHANGE UP (ref 27–39.2)
AST SERPL-CCNC: 34 U/L — SIGNIFICANT CHANGE UP (ref 0–41)
BACTERIA # UR AUTO: NEGATIVE — SIGNIFICANT CHANGE UP
BASOPHILS # BLD AUTO: 0.09 K/UL — SIGNIFICANT CHANGE UP (ref 0–0.2)
BASOPHILS NFR BLD AUTO: 1.2 % — HIGH (ref 0–1)
BILIRUB SERPL-MCNC: 0.3 MG/DL — SIGNIFICANT CHANGE UP (ref 0.2–1.2)
BILIRUB UR-MCNC: NEGATIVE — SIGNIFICANT CHANGE UP
BUN SERPL-MCNC: 30 MG/DL — HIGH (ref 10–20)
CALCIUM SERPL-MCNC: 9.1 MG/DL — SIGNIFICANT CHANGE UP (ref 8.5–10.1)
CHLORIDE SERPL-SCNC: 108 MMOL/L — SIGNIFICANT CHANGE UP (ref 98–110)
CO2 SERPL-SCNC: 25 MMOL/L — SIGNIFICANT CHANGE UP (ref 17–32)
COLOR SPEC: SIGNIFICANT CHANGE UP
CREAT SERPL-MCNC: 1.6 MG/DL — HIGH (ref 0.7–1.5)
DIFF PNL FLD: NEGATIVE — SIGNIFICANT CHANGE UP
EOSINOPHIL # BLD AUTO: 0.36 K/UL — SIGNIFICANT CHANGE UP (ref 0–0.7)
EOSINOPHIL NFR BLD AUTO: 4.8 % — SIGNIFICANT CHANGE UP (ref 0–8)
EPI CELLS # UR: 3 /HPF — SIGNIFICANT CHANGE UP (ref 0–5)
GLUCOSE SERPL-MCNC: 80 MG/DL — SIGNIFICANT CHANGE UP (ref 70–99)
GLUCOSE UR QL: NEGATIVE — SIGNIFICANT CHANGE UP
HCT VFR BLD CALC: 37.4 % — LOW (ref 42–52)
HGB BLD-MCNC: 11.9 G/DL — LOW (ref 14–18)
HYALINE CASTS # UR AUTO: 4 /LPF — SIGNIFICANT CHANGE UP (ref 0–7)
IMM GRANULOCYTES NFR BLD AUTO: 0.3 % — SIGNIFICANT CHANGE UP (ref 0.1–0.3)
INR BLD: 1.04 RATIO — SIGNIFICANT CHANGE UP (ref 0.65–1.3)
KETONES UR-MCNC: NEGATIVE — SIGNIFICANT CHANGE UP
LEUKOCYTE ESTERASE UR-ACNC: ABNORMAL
LYMPHOCYTES # BLD AUTO: 2.1 K/UL — SIGNIFICANT CHANGE UP (ref 1.2–3.4)
LYMPHOCYTES # BLD AUTO: 27.9 % — SIGNIFICANT CHANGE UP (ref 20.5–51.1)
MCHC RBC-ENTMCNC: 31.2 PG — HIGH (ref 27–31)
MCHC RBC-ENTMCNC: 31.8 G/DL — LOW (ref 32–37)
MCV RBC AUTO: 98.2 FL — HIGH (ref 80–94)
MONOCYTES # BLD AUTO: 0.51 K/UL — SIGNIFICANT CHANGE UP (ref 0.1–0.6)
MONOCYTES NFR BLD AUTO: 6.8 % — SIGNIFICANT CHANGE UP (ref 1.7–9.3)
NEUTROPHILS # BLD AUTO: 4.44 K/UL — SIGNIFICANT CHANGE UP (ref 1.4–6.5)
NEUTROPHILS NFR BLD AUTO: 59 % — SIGNIFICANT CHANGE UP (ref 42.2–75.2)
NITRITE UR-MCNC: NEGATIVE — SIGNIFICANT CHANGE UP
NRBC # BLD: 0 /100 WBCS — SIGNIFICANT CHANGE UP (ref 0–0)
PH UR: 6 — SIGNIFICANT CHANGE UP (ref 5–8)
PLATELET # BLD AUTO: 170 K/UL — SIGNIFICANT CHANGE UP (ref 130–400)
POTASSIUM SERPL-MCNC: 4.4 MMOL/L — SIGNIFICANT CHANGE UP (ref 3.5–5)
POTASSIUM SERPL-SCNC: 4.4 MMOL/L — SIGNIFICANT CHANGE UP (ref 3.5–5)
PROT SERPL-MCNC: 6.7 G/DL — SIGNIFICANT CHANGE UP (ref 6–8)
PROT UR-MCNC: ABNORMAL
PROTHROM AB SERPL-ACNC: 12 SEC — SIGNIFICANT CHANGE UP (ref 9.95–12.87)
RBC # BLD: 3.81 M/UL — LOW (ref 4.7–6.1)
RBC # FLD: 15.2 % — HIGH (ref 11.5–14.5)
RBC CASTS # UR COMP ASSIST: 1 /HPF — SIGNIFICANT CHANGE UP (ref 0–4)
SODIUM SERPL-SCNC: 143 MMOL/L — SIGNIFICANT CHANGE UP (ref 135–146)
SP GR SPEC: 1.02 — SIGNIFICANT CHANGE UP (ref 1.01–1.03)
UROBILINOGEN FLD QL: SIGNIFICANT CHANGE UP
WBC # BLD: 7.52 K/UL — SIGNIFICANT CHANGE UP (ref 4.8–10.8)
WBC # FLD AUTO: 7.52 K/UL — SIGNIFICANT CHANGE UP (ref 4.8–10.8)
WBC UR QL: 15 /HPF — HIGH (ref 0–5)

## 2020-12-09 RX ORDER — FOLIC ACID 0.8 MG
1 TABLET ORAL
Qty: 0 | Refills: 0 | DISCHARGE

## 2020-12-09 RX ORDER — ERGOCALCIFEROL 1.25 MG/1
2500 CAPSULE ORAL
Qty: 0 | Refills: 0 | DISCHARGE

## 2020-12-09 NOTE — H&P PST ADULT - HISTORY OF PRESENT ILLNESS
pmhx of bladder ca now for above procedure, tumors reappear and procedure is done almost bi- annually    PATIENT CURRENTLY DENIES CHEST PAIN  SHORTNESS OF BREATH  PALPITATIONS,  DYSURIA, OR UPPER RESPIRATORY INFECTION IN PAST 2 WEEKS  EXERCISE  TOLERANCE  1-2 FLIGHT OF STAIRS  WITHOUT SHORTNESS OF BREATH  denies travel outside the [place] in the past 30 days  pt denies any covid s/s, or tested positive in the past  pt advised self quarantine till day of procedure  Anesthesia Alert  NO--Difficult Airway  NO--History of neck surgery or radiation  NO--Limited ROM of neck  NO--History of Malignant hyperthermia  NO--No personal or family history of Pseudocholinesterase deficiency.  NO--Prior Anesthesia Complication  NO--Latex Allergy  NO--Loose teeth  NO--History of Rheumatoid Arthritis  + -KAVITHA  NO--Other_____      as per pt there is no interim changes in medical hx since last visit 7/2020 pmhx of bladder ca now for above procedure, tumors reappear and procedure is done almost bi- annually    PATIENT CURRENTLY DENIES CHEST PAIN  SHORTNESS OF BREATH  PALPITATIONS,  DYSURIA, OR UPPER RESPIRATORY INFECTION IN PAST 2 WEEKS  EXERCISE  TOLERANCE  1-2 FLIGHT OF STAIRS  WITHOUT SHORTNESS OF BREATH  denies travel outside the [place] in the past 30 days  pt denies any covid s/s, or tested positive in the past  pt advised self quarantine till day of procedure  Anesthesia Alert  NO--Difficult Airway  NO--History of neck surgery or radiation  NO--Limited ROM of neck  NO--History of Malignant hyperthermia  NO--No personal or family history of Pseudocholinesterase deficiency.  NO--Prior Anesthesia Complication  NO--Latex Allergy  NO--Loose teeth  + History of Rheumatoid Arthritis  + -KAVITHA  NO--Other_____      as per pt there is no interim changes in medical hx since last visit 7/2020

## 2020-12-09 NOTE — H&P PST ADULT - ATTENDING COMMENTS
for turbt.  risks benefits, alternatives discussed including bleeding, infection, perforation, catheter

## 2020-12-09 NOTE — H&P PST ADULT - NSICDXFAMILYHX_GEN_ALL_CORE_FT
Please kindly instruct patient that recent labs for CBC, CMP, urine, lipids and thyroid were all reasonable.  The glucose was up a few points fasting.  We will discuss these labs in more detail at her next scheduled appointment.   
FAMILY HISTORY:  Family history of coronary artery disease    Father  Still living? No  Cancer, Age at diagnosis: Age Unknown

## 2020-12-10 LAB
CULTURE RESULTS: SIGNIFICANT CHANGE UP
SPECIMEN SOURCE: SIGNIFICANT CHANGE UP

## 2020-12-14 ENCOUNTER — LABORATORY RESULT (OUTPATIENT)
Age: 75
End: 2020-12-14

## 2020-12-14 ENCOUNTER — OUTPATIENT (OUTPATIENT)
Dept: OUTPATIENT SERVICES | Facility: HOSPITAL | Age: 75
LOS: 1 days | Discharge: HOME | End: 2020-12-14

## 2020-12-14 DIAGNOSIS — Z98.890 OTHER SPECIFIED POSTPROCEDURAL STATES: Chronic | ICD-10-CM

## 2020-12-14 DIAGNOSIS — Z11.59 ENCOUNTER FOR SCREENING FOR OTHER VIRAL DISEASES: ICD-10-CM

## 2020-12-15 ENCOUNTER — NON-APPOINTMENT (OUTPATIENT)
Age: 75
End: 2020-12-15

## 2020-12-17 ENCOUNTER — RESULT REVIEW (OUTPATIENT)
Age: 75
End: 2020-12-17

## 2020-12-17 ENCOUNTER — OUTPATIENT (OUTPATIENT)
Dept: OUTPATIENT SERVICES | Facility: HOSPITAL | Age: 75
LOS: 1 days | Discharge: HOME | End: 2020-12-17
Payer: MEDICARE

## 2020-12-17 ENCOUNTER — APPOINTMENT (OUTPATIENT)
Dept: UROLOGY | Facility: HOSPITAL | Age: 75
End: 2020-12-17

## 2020-12-17 VITALS
WEIGHT: 233.03 LBS | OXYGEN SATURATION: 98 % | DIASTOLIC BLOOD PRESSURE: 58 MMHG | HEART RATE: 57 BPM | RESPIRATION RATE: 17 BRPM | HEIGHT: 70 IN | SYSTOLIC BLOOD PRESSURE: 116 MMHG | TEMPERATURE: 96 F

## 2020-12-17 VITALS — DIASTOLIC BLOOD PRESSURE: 63 MMHG | RESPIRATION RATE: 17 BRPM | SYSTOLIC BLOOD PRESSURE: 135 MMHG | HEART RATE: 53 BPM

## 2020-12-17 DIAGNOSIS — Z98.890 OTHER SPECIFIED POSTPROCEDURAL STATES: Chronic | ICD-10-CM

## 2020-12-17 PROCEDURE — 88360 TUMOR IMMUNOHISTOCHEM/MANUAL: CPT | Mod: 26

## 2020-12-17 PROCEDURE — 52235 CYSTOSCOPY AND TREATMENT: CPT

## 2020-12-17 PROCEDURE — 88313 SPECIAL STAINS GROUP 2: CPT | Mod: 26

## 2020-12-17 PROCEDURE — 88342 IMHCHEM/IMCYTCHM 1ST ANTB: CPT | Mod: 26,59

## 2020-12-17 PROCEDURE — 88341 IMHCHEM/IMCYTCHM EA ADD ANTB: CPT | Mod: 26,59

## 2020-12-17 PROCEDURE — 88307 TISSUE EXAM BY PATHOLOGIST: CPT | Mod: 26

## 2020-12-17 RX ORDER — MORPHINE SULFATE 50 MG/1
2 CAPSULE, EXTENDED RELEASE ORAL
Refills: 0 | Status: DISCONTINUED | OUTPATIENT
Start: 2020-12-17 | End: 2020-12-17

## 2020-12-17 RX ORDER — ATENOLOL 25 MG/1
1 TABLET ORAL
Qty: 0 | Refills: 0 | DISCHARGE

## 2020-12-17 RX ORDER — ASPIRIN/CALCIUM CARB/MAGNESIUM 324 MG
1 TABLET ORAL
Qty: 0 | Refills: 0 | DISCHARGE

## 2020-12-17 RX ORDER — LATANOPROST 0.05 MG/ML
1 SOLUTION/ DROPS OPHTHALMIC; TOPICAL
Qty: 0 | Refills: 0 | DISCHARGE

## 2020-12-17 RX ORDER — PHENAZOPYRIDINE HCL 100 MG
1 TABLET ORAL
Qty: 21 | Refills: 0
Start: 2020-12-17 | End: 2020-12-23

## 2020-12-17 RX ORDER — PHENAZOPYRIDINE HCL 100 MG
100 TABLET ORAL ONCE
Refills: 0 | Status: COMPLETED | OUTPATIENT
Start: 2020-12-17 | End: 2020-12-17

## 2020-12-17 RX ORDER — ALLOPURINOL 300 MG
1 TABLET ORAL
Qty: 0 | Refills: 0 | DISCHARGE

## 2020-12-17 RX ORDER — CANDESARTAN CILEXETIL AND HYDROCHLOROTHIAZIDE 32; 12.5 MG/1; MG/1
1 TABLET ORAL
Qty: 0 | Refills: 0 | DISCHARGE

## 2020-12-17 RX ORDER — LANSOPRAZOLE 15 MG/1
1 CAPSULE, DELAYED RELEASE ORAL
Qty: 0 | Refills: 0 | DISCHARGE

## 2020-12-17 RX ORDER — SODIUM CHLORIDE 9 MG/ML
1000 INJECTION, SOLUTION INTRAVENOUS
Refills: 0 | Status: DISCONTINUED | OUTPATIENT
Start: 2020-12-17 | End: 2020-12-31

## 2020-12-17 RX ORDER — FLUTICASONE PROPIONATE AND SALMETEROL 50; 250 UG/1; UG/1
1 POWDER ORAL; RESPIRATORY (INHALATION)
Qty: 0 | Refills: 0 | DISCHARGE

## 2020-12-17 RX ORDER — OXYCODONE AND ACETAMINOPHEN 5; 325 MG/1; MG/1
1 TABLET ORAL EVERY 4 HOURS
Refills: 0 | Status: DISCONTINUED | OUTPATIENT
Start: 2020-12-17 | End: 2020-12-17

## 2020-12-17 RX ORDER — ZINC SULFATE TAB 220 MG (50 MG ZINC EQUIVALENT) 220 (50 ZN) MG
1 TAB ORAL
Qty: 0 | Refills: 0 | DISCHARGE

## 2020-12-17 RX ORDER — CEPHALEXIN 500 MG
1 CAPSULE ORAL
Qty: 12 | Refills: 0
Start: 2020-12-17 | End: 2020-12-19

## 2020-12-17 RX ORDER — CHOLECALCIFEROL (VITAMIN D3) 125 MCG
0 CAPSULE ORAL
Qty: 0 | Refills: 0 | DISCHARGE

## 2020-12-17 RX ADMIN — Medication 100 MILLIGRAM(S): at 10:50

## 2020-12-17 NOTE — ASU DISCHARGE PLAN (ADULT/PEDIATRIC) - CARE PROVIDER_API CALL
Aisha Fernandes)  Urology  [address] [place] [address], [address]  [place] [address]  Phone: [phone]  Fax: [phone]  Follow Up Time: 1-3 days

## 2020-12-17 NOTE — BRIEF OPERATIVE NOTE - NSICDXBRIEFPROCEDURE_GEN_ALL_CORE_FT
PROCEDURES:  Cystoscopy with fulguration and resection of bladder tumor 17-Dec-2020 10:04:08  Aisha Fernandes

## 2020-12-18 ENCOUNTER — APPOINTMENT (OUTPATIENT)
Dept: UROLOGY | Facility: CLINIC | Age: 75
End: 2020-12-18
Payer: MEDICARE

## 2020-12-18 VITALS — HEIGHT: 71 IN | WEIGHT: 234 LBS | TEMPERATURE: 97.9 F | BODY MASS INDEX: 32.76 KG/M2

## 2020-12-18 PROCEDURE — 99213 OFFICE O/P EST LOW 20 MIN: CPT

## 2020-12-18 NOTE — HISTORY OF PRESENT ILLNESS
[FreeTextEntry1] : 75-year-old with long history of bladder cancer with multiple recurrences. 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 This year he had a high-grade T1 lesion underwent intravesical gemcitabine. Posttreatment has a recurrence and underwent resection yesterday. Lesion appeared high-grade awaiting pathology. Urine remains clear via catheter. No abdominal pain

## 2020-12-18 NOTE — REVIEW OF SYSTEMS
[Fever] : no fever [Nasal Discharge] : no nasal discharge [Chest Pain] : no chest pain [Cough] : no cough [Constipation] : no constipation [Dysuria] : no dysuria [Skin Wound] : no skin wound [Confused] : no confusion

## 2020-12-18 NOTE — PHYSICAL EXAM
[General Appearance - In No Acute Distress] : no acute distress [Urethral Meatus] : meatus normal [Penis Abnormality] : normal uncircumcised penis [Scrotum] : the scrotum was normal [Edema] : no peripheral edema [] : no respiratory distress [Respiration, Rhythm And Depth] : normal respiratory rhythm and effort [Oriented To Time, Place, And Person] : oriented to person, place, and time [Affect] : the affect was normal [Not Anxious] : not anxious

## 2020-12-18 NOTE — ASSESSMENT
[FreeTextEntry1] : Status post TURBT. Catheter removed today. Pathology pending. Patient going for lung surgery for lung mass. PET scan showed no other lesions. Further treatment for bladder cancer to be determined once pathology is ready

## 2020-12-23 PROBLEM — Z87.440 HISTORY OF URINARY TRACT INFECTION: Status: RESOLVED | Noted: 2018-02-09 | Resolved: 2020-12-23

## 2020-12-24 LAB — SURGICAL PATHOLOGY STUDY: SIGNIFICANT CHANGE UP

## 2020-12-29 DIAGNOSIS — G47.33 OBSTRUCTIVE SLEEP APNEA (ADULT) (PEDIATRIC): ICD-10-CM

## 2020-12-29 DIAGNOSIS — H40.9 UNSPECIFIED GLAUCOMA: ICD-10-CM

## 2020-12-29 DIAGNOSIS — C67.4 MALIGNANT NEOPLASM OF POSTERIOR WALL OF BLADDER: ICD-10-CM

## 2020-12-29 DIAGNOSIS — I10 ESSENTIAL (PRIMARY) HYPERTENSION: ICD-10-CM

## 2020-12-29 DIAGNOSIS — J44.9 CHRONIC OBSTRUCTIVE PULMONARY DISEASE, UNSPECIFIED: ICD-10-CM

## 2020-12-29 DIAGNOSIS — N32.89 OTHER SPECIFIED DISORDERS OF BLADDER: ICD-10-CM

## 2020-12-29 DIAGNOSIS — M35.00 SJOGREN SYNDROME, UNSPECIFIED: ICD-10-CM

## 2020-12-29 DIAGNOSIS — Z87.891 PERSONAL HISTORY OF NICOTINE DEPENDENCE: ICD-10-CM

## 2020-12-29 DIAGNOSIS — M10.9 GOUT, UNSPECIFIED: ICD-10-CM

## 2020-12-29 DIAGNOSIS — K21.9 GASTRO-ESOPHAGEAL REFLUX DISEASE WITHOUT ESOPHAGITIS: ICD-10-CM

## 2020-12-29 DIAGNOSIS — Z88.2 ALLERGY STATUS TO SULFONAMIDES: ICD-10-CM

## 2020-12-29 DIAGNOSIS — M06.9 RHEUMATOID ARTHRITIS, UNSPECIFIED: ICD-10-CM

## 2020-12-29 DIAGNOSIS — Z79.82 LONG TERM (CURRENT) USE OF ASPIRIN: ICD-10-CM

## 2021-01-08 ENCOUNTER — APPOINTMENT (OUTPATIENT)
Dept: UROLOGY | Facility: CLINIC | Age: 76
End: 2021-01-08
Payer: COMMERCIAL

## 2021-01-08 VITALS — TEMPERATURE: 97.8 F | WEIGHT: 234 LBS | HEIGHT: 71 IN | BODY MASS INDEX: 32.76 KG/M2

## 2021-01-08 DIAGNOSIS — C67.9 MALIGNANT NEOPLASM OF BLADDER, UNSPECIFIED: ICD-10-CM

## 2021-01-08 PROCEDURE — 99214 OFFICE O/P EST MOD 30 MIN: CPT

## 2021-01-08 NOTE — ASSESSMENT
[FreeTextEntry1] : Recurrent high grade T1 disease post intravesical chemotherapy. Discussed fully with patient. We'll need to get pathology from lung to determine whether this is primary lung or metastatic disease as treatments will deferRecurrent high grade T1 disease post intravesical chemotherapy. Discussed fully with patient. We'll need to get pathology from lung to determine whether this is primary lung or metastatic disease as treatments will differ.\par \par discussed iv chemo as primary treatment for metastatic disease, if localized, then consider intravesical bcg vs systemic chemo/rt.  pt absolutely against cystectomy/diversion

## 2021-01-08 NOTE — REVIEW OF SYSTEMS
[Feeling Poorly] : not feeling poorly [Feeling Tired] : not feeling tired [Sore Throat] : no sore throat [Chest Pain] : no chest pain [Shortness Of Breath] : no shortness of breath [Constipation] : no constipation [Diarrhea] : no diarrhea [Dysuria] : no dysuria

## 2021-01-08 NOTE — HISTORY OF PRESENT ILLNESS
[FreeTextEntry1] : 75-year-old with recent removal of small bladder cancer from superior bladder wall which was solitary and high grade T1 right up to the muscle layer. He is urinating well without hematuria or burning. 2 years ago had CIS treated with BCG, after that was negative until 6 months ago developed high grade T1 disease treated with intravesical gemcitabine now with recurrence. Prior to 2 years ago he had a long history of low-grade TA disease.\par \par 2 weeks ago also had resection of lung tumor. This was done at [facility]. Patient is still awaiting pathology. He is recovering well.

## 2021-02-25 NOTE — H&P PST ADULT - MUSCULOSKELETAL
(1999); Coronary angioplasty with stent (05/21/2013); pacemaker placement; Coronary artery bypass graft (10/14/2015); and hernia repair (08/2019). Allergies:  Patient has no known allergies. Social History:   reports that he has been smoking cigarettes. He has a 20.00 pack-year smoking history. He has never used smokeless tobacco. He reports that he does not drink alcohol or use drugs. Family History: family history includes Diabetes in his mother; High Blood Pressure in his father, mother, and sister; Linnell Fillers / [place] in his mother. Home Medications:    Prior to Admission medications    Medication Sig Start Date End Date Taking? Authorizing Provider   lisinopril (PRINIVIL;ZESTRIL) 10 MG tablet TAKE 1 TABLET BY MOUTH  DAILY 10/12/20  Yes Cheryle Mathews MD   atorvastatin (LIPITOR) 80 MG tablet TAKE 1 TABLET BY MOUTH  NIGHTLY 8/10/20  Yes Cheryle Mathews MD   clopidogrel (PLAVIX) 75 MG tablet TAKE 1 TABLET BY MOUTH  DAILY 8/10/20  Yes Cheryle Mathews MD   carvedilol (COREG) 25 MG tablet Take 1 tablet by mouth 2 times daily TAKE 1 TABLET BY MOUTH TWO  TIMES DAILY 8/7/20  Yes Lino Mace MD   aspirin 81 MG chewable tablet Take 1 tablet by mouth daily. 5/25/13  Yes JOHN PAUL Price - CNP       REVIEW OF SYSTEMS:    All 14-point review of systems are completed and  pertinent positives are mentioned in the history of present illness. Other  systems are reviewed and are negative. Physical Examination:    /80   Pulse 76   Ht 5' 8\" (1.727 m)   Wt 175 lb (79.4 kg)   SpO2 97%   BMI 26.61 kg/m²      Constitutional and General Appearance:    alert, cooperative, no distress and appears stated age  [de-identified]:    PERRLA, no cervical lymphadenopathy. No masses palpable.  Normal oral  mucosa  Respiratory:  · Normal excursion and expansion without use of accessory muscles  · Resp Auscultation: Normal breath sounds without dullness or wheezing  Cardiovascular:  · The apical impulse is not displaced  · Heart tones are crisp and normal. regular S1 and S2.  · Grade II/VI systolic murmur best LLSB  Abdomen:  · No masses or tenderness  · Bowel sounds present  Extremities:  ·  No Cyanosis or Clubbing  ·  Lower extremity edema: No  · Skin: Warm and dry  Neurological:  · Alert and oriented. · Moves all extremities well  · No abnormalities of mood, affect, memory, mentation, or behavior are noted    DATA:    ECG 2/25/2021:  A-paced 65 BPM.     IMPRESSION:    1. Ventricular tachycardia ([address]    2. Coronary artery disease involving native coronary artery of native heart with unstable angina pectoris ([address]    3. Systolic CHF, chronic ([address]    4. Ischemic cardiomyopathy    5. NSTEMI (non-ST elevated myocardial infarction) ([address]    6. PAF (paroxysmal atrial fibrillation) ([address]    7. S/P CABG x 2    8. Cardiac pacemaker in situ    9. Dual ICD (implantable cardioverter-defibrillator) in place      1. Non-sustained ventricular tachycardia/ventricular tachycardia. Patient is a pleasant 79-year-old male with a medical regimen for ventricular tachycardia prior to CABG, ischemic cardiomyopathy status post CABG, paroxysmal atrial fibrillation not on anticoagulation per patient's preference, hypertension, hyperlipidemia, sick sinus syndrome status post dual-chamber ICD who presents from home to hospitals care. She has had no shocks from his device. His continues have some nonsustained ventricular tachycardia on his device but nothing lasting more we discussed potential options including antiarrhythmic therapy and ablation. At this point I see no indication either of these options at this point. We will continue remote monitoring for now. Follow-up with EP NP in 1 year. - Continue GDMT with carvedilol.  - Continue remote monitoring.     2. Sick sinus syndrome status post DC ICD ([employer]). Patient has history of sick sinus syndrome. He is status post dual-chamber ICD.   He is atrially paced approximately 100% of the time and ventricular pacing approximately 1% of the time. Device function appropriate and stable. - Plan as per above.     3. Paroxysmal atrial fibrillation ([id number] 4). Patient with history of paroxysmal atrial fibrillation. His [id number] score is 4. Patient not interested in anticoagulation at this point. No atrial fibrillation on most recent check. - Continue coreg.     4. Ischemic cardiomyopathy status post CABG. Stable. Chest pain unchanged and continues to smoke. - Continue to follow up with Dr. Aretha Mckeon.     5. COPD.     RECOMMENDATIONS:  1. Continue current medications as prescribed. 2. Remain as active as possible. Encourage smoking cessation. 3. Continue with regular device checks. 4. Follow up with me in 1 year. QUALITY MEASURES  1. Tobacco Cessation Counseling: Yes  2. Retake of BP if >140/90:   NA  3. Documentation to PCP/referring for new patient:  Sent to PCP at close of office visit  4. CAD patient on anti-platelet: Yes  5. CAD patient on STATIN therapy:  Yes  6. Patient with CHF and aFib on anticoagulation:  No     All questions and concerns were addressed to the patient/family. Alternatives to my treatment were discussed. Dr. Vianey Guillory MD  Electrophysiology  [facility] [facility]. [place], [address]  Phone: [phone]  Fax: [phone]     This note was scribed in the presence of Dr. Phong Larios MD by Karyle Brochure, RN. The scribe's documentation has been prepared under my direction and personally reviewed by me in its entirety. I confirm that the note above accurately reflects all work, physical examination, the discussion of treatments and procedures, and medical decision making performed by me. Willy Morrow MD personally performed the services described in this documentation as scribed by nurse in my presence, and is both accurate and complete.     Electronically signed by Eliza Bauman Aneta Velasco MD on 2/25/2021 at 1:23 PM       NOTE: This report was transcribed using voice recognition software. Every effort was made to ensure accuracy, however, inadvertent computerized transcription errors may be present. No joint pain, swelling or deformity; no limitation of movement

## 2021-03-25 NOTE — H&P PST ADULT - AIRWAY
normal Detail Level: Zone Otc Regimen: Discussed risks and benefits of OTC sunscreen 30spf or greater

## 2021-04-29 NOTE — PATIENT PROFILE ADULT - BRADEN NUTRITION
[FreeTextEntry1] : Documented by Liam Gonzalez acting as a scribe for Dr. Hernán Cobian on 04/29/2021.\par \par All medical record entries made by the Scribe were at my, Dr. Hernán Cobian's, direction and personally dictated by me on 04/29/2021. I have reviewed the chart and agree that the record accurately reflects my personal performance of the history, physical exam, assessment and plan. I have also personally directed, reviewed, and agree with the discharge instructions. 
(3) adequate

## 2021-07-20 NOTE — ASU PREOP CHECKLIST - HEIGHT IN INCHES
CC:  Edison Roelolita is here today for Office Visit (Arm Bruising/Dizziness )   Low B/P since Piroxicam and Gabapentin    Medications: medications verified and updated  Refills needed today?  NO  Patient would like communication of their results via:    Cell Phone:   Telephone Information:   Mobile [phone]   .  Okay to leave a detailed message containing results? Yes  Advanced directives: Yes on file    Health Maintenance Due   Topic Date Due   • Shingles Vaccine (1 of 2) Never done   • DM/CKD Microalbumin  05/27/2021   • Medicare Wellness Visit  05/27/2021   • Lung Cancer Screening  07/30/2021     Patient to discuss with PCP          10

## 2021-08-11 NOTE — ED PROVIDER NOTE - PHYSICAL EXAMINATION
PHYSICAL EXAM:    GENERAL: Alert, appears stated age, well appearing, non-toxic  SKIN: Warm, pink and dry  EYE: Normal lids/conjunctiva  ENT: Normal hearing, patent oropharynx  NECK: +supple. No meningismus  Pulm: Bilateral BS, normal resp effort, no wheezes, stridor, or retractions  CV: RRR, no M/R/G  Abd: soft, non-tender, non-distended, no hepatosplenomegaly. no CVA tenderness. no rebound guarding.   Mskel: no erythema, cyanosis, edema. no calf tenderness   Neuro: AAOx3,  5/5 strength throughout. normal gait. PERRL/EOMI/conjunctiva clear detailed exam

## 2022-01-20 NOTE — ASU PREOP CHECKLIST - TEMPERATURE IN CELSIUS (DEGREES C)
Patient arrives to walk in clinic with complaint of rash in groin. Had some irritation from compression shorts that he wears while working out. Thought he was applying hydrocortisone cream but applied neosporin, which he is allergic to . Onset yesterday. Medications used: none.      35.6

## 2022-04-11 NOTE — ASU PATIENT PROFILE, ADULT - NS PRO ABUSE SCREEN SUSPICION NEGLECT YN
[facility] Medicine  Progress Note    Patient Name: Edgar Jackson  MRN: [id number]  Patient Class: IP- Inpatient   Admission Date: 4/7/2022  Length of Stay: 3 days  Attending Physician: Nori Lezama MD  Primary Care Provider: Tyler Castillo MD        Subjective:     Principal Problem:Fever        HPI:  Mr. Jackson is a 59-year-old male with a past medical history of renal failure status post renal transplant 2004, NIDDM2, ILD, and cirrhosis who presents today with complaints of elevated creatinine on outpatient labs.  It is severe.  It is associated with recent liver biopsy and paracentesis and recent addition of Lasix and spironolactone.  He denies fever, chills, nausea, vomiting, diarrhea, chest pain, shortness of breath, loss of consciousness.  He has a chronic cough that is productive of clear/white/yellow sputum at baseline which he states has worsened over the last 2 months. He had a liver biopsy and paracentesis yesterday at [facility] with Dr. Osullivan and his creatinine was 3.1. He was called and referred to the ED for further work up.       Overview/Hospital Course:  Patient with a history of remote renal transplant, secondary to hypertensive renal failure, on chronic immunosuppressant (sirolimus and tacrolimus), diagnosis of cirrhosis who underwent recent transjugular biopsy (pathology with chronic mild to moderate active hepatitis with bridging fibrosis and nodules) as well as paracentesis (2750 cc removed) on 04/06.  He also was seen by Pulmonary for concern of interstitial lung disease and reports ongoing dyspnea on exertion with cough.  He was referred to the ED due to abnormal outpatient labs with acute kidney injury.  He was borderline hypotensive in the ED, creatinine 3.1.  Also noted to have hyponatremia, hyperkalemia with metabolic acidosis.  Ongoing diarrhea quantified as 4 bowel movements per day.  On 04/08, renal function with creatinine 2.7, minimal urine output, on bicarbonate  drip, stool studies in process, Nephrology and Gastroenterology following.  On 04/09 still having diarrhea, about 8 bowel movements overnight, states no relation to time of day, usually after every meal, blood pressure is improved, BUN/creatinine 37/2.7.  On 04/10 states overall he feels better, Creon was started yesterday and diarrhea is improving, states he believes he is producing more urine, BUN/creatinine 40/2.6, Nephrology resuming bicarbonate drip, pulmonary consulted. On 4/11 developed fever and work up started.      Interval History:  Patient states diarrhea continues to improve, now having formed stools, 2 bowel movements overnight and 1 of as this morning.  Denies any worsening shortness of breath, cough remains nonproductive, mild distention of abdomen but no significant pain, denies any new skin rashes/lesions, denies any dysuria.  No upper respiratory tract symptoms.  States he is making urine.  Febrile to 100.6.  Labs with WBC 4, hemoglobin 10.1, platelet 41, sodium low at 125, BUN/creatinine 38/2.4, magnesium 1.5.  Documented urine output 1675. Discussed with patient and wife present at bedside.      Review of Systems   Constitutional:  Positive for fever.   HENT:  Negative for congestion and sore throat.    Respiratory:  Positive for cough.    Cardiovascular:  Negative for chest pain.   Gastrointestinal:  Negative for nausea and vomiting.   Genitourinary:  Negative for difficulty urinating.   Skin:  Negative for rash and wound.   Allergic/Immunologic: Positive for immunocompromised state.   Neurological:  Negative for headaches.   Psychiatric/Behavioral:  Negative for confusion.    Objective:     Vital Signs (Most Recent):  Temp: 100.1 °F (37.8 °C) (04/11/22 0809)  Pulse: 67 (04/11/22 0658)  Resp: 18 (04/11/22 0658)  BP: 126/87 (04/11/22 0658)  SpO2: (!) 92 % (04/11/22 0658)   Vital Signs (24h Range):  Temp:  [37.8 °F (3.2 °C)-100.6 °F (38.1 °C)] 100.1 °F (37.8 °C)  Pulse:  [67-73] 67  Resp:  [18]  18  SpO2:  [92 %-95 %] 92 %  BP: (117-135)/(75-88) 126/87     Weight: 63.6 kg (140 lb 3.4 oz)  Body mass index is 21.32 kg/m².    Intake/Output Summary (Last 24 hours) at 4/11/2022 1101  Last data filed at 4/11/2022 0832  Gross per 24 hour   Intake 2926 ml   Output 1925 ml   Net 1001 ml      Physical Exam  Vitals and nursing note reviewed.   Constitutional:       General: He is not in acute distress.     Appearance: He is not diaphoretic.      Comments: Sitting in bed, NAD, cooperative   HENT:      Head: Normocephalic and atraumatic.      Mouth/Throat:      Mouth: Mucous membranes are moist.      Comments: No oral thrush  Eyes:      General:         Right eye: No discharge.         Left eye: No discharge.   Cardiovascular:      Rate and Rhythm: Normal rate and regular rhythm.   Pulmonary:      Comments: On RA, inspiratory crackles at bases  Abdominal:      General: Bowel sounds are normal.      Palpations: Abdomen is soft.      Tenderness: There is no abdominal tenderness.      Comments: Abdomen not tense, non tender   Skin:     General: Skin is warm and dry.   Neurological:      Mental Status: He is alert and oriented to person, place, and time.      Comments: Speech intact, moving all four extremities, generalized weakness   Psychiatric:         Mood and Affect: Mood normal.       Significant Labs: Blood Culture: No results for input(s): LABBLOO in the last 48 hours.  BMP:   Recent Labs   Lab 04/11/22  0852   *   *   K 4.2   CL 96   CO2 21*   BUN 38*   CREATININE 2.4*   CALCIUM 7.1*   MG 1.5*     CBC:   Recent Labs   Lab 04/10/22  0629 04/11/22  0852   WBC 4.14 4.16   HGB 9.6* 10.1*   HCT 28.0* 29.5*   PLT 38* 41*     CMP:   Recent Labs   Lab 04/10/22  0629 04/11/22  0852   * 125*   K 5.0 4.2    96   CO2 19* 21*   * 195*   BUN 40* 38*   CREATININE 2.6* 2.4*   CALCIUM 7.5* 7.1*   PROT 5.3* 5.4*   ALBUMIN 2.5* 2.5*   BILITOT 1.8* 2.1*   ALKPHOS 88 86   * 122*   ALT 24 24    ANIONGAP 5* 8   EGFRNONAA 25.7* 28.3*     Cardiac Markers: No results for input(s): CKMB, MYOGLOBIN, BNP, TROPISTAT in the last 48 hours.  Lactic Acid: No results for input(s): LACTATE in the last 48 hours.  Magnesium:   Recent Labs   Lab 04/10/22  0629 04/11/22  0852   MG 1.7 1.5*     Respiratory Culture: No results for input(s): GSRESP, RESPIRATORYC in the last 48 hours.  Troponin: No results for input(s): TROPONINI in the last 48 hours.  TSH:   Recent Labs   Lab 02/09/22  0814   TSH 1.536     Urine Culture: No results for input(s): LABURIN in the last 48 hours.  Urine Studies: No results for input(s): COLORU, APPEARANCEUA, PHUR, SPECGRAV, PROTEINUA, GLUCUA, KETONESU, BILIRUBINUA, OCCULTUA, NITRITE, UROBILINOGEN, LEUKOCYTESUR, RBCUA, WBCUA, BACTERIA, SQUAMEPITHEL, HYALINECASTS in the last 48 hours.    Invalid input(s): WRIGHTSUR    Significant Imaging: I have reviewed and interpreted all pertinent imaging results/findings within the past 24 hours.      Assessment/Plan:      * Fever in immunocomprised patient  Fever of 100.6, denies any new localizing source  Pancultures including UA with reflex urine culture, blood culture, chest x-ray being repeated, diagnostic paracentesis  Given only potential source at this time and patient is immunocompromised will treat empirically for SBP with Rocephin  Continue to monitor temperature curve and follow up culture results  Infectious disease consulted    Diarrhea  Better with Creon, continue  Appreciate GI input      ILDEFONSO (acute kidney injury)  Slowly improving and is producing urine  Bicarbonate drip discontinued, continue p.o. bicarbonate  Repeat UA with reflex urine culture given fever  Ultrasound transplant kidney no acute  Continue to hold lisinopril, Lasix, spironolactone, metformin  Renally dosing all medications avoiding nephrotoxic drugs  Appreciate nephrology input      Anemia  Chronic and multifactorial      Thrombocytopenia  No evidence of bleeding, due to  cirrhosis, monitoring      Hypothyroidism        Metabolic acidosis  Sodium bicarbonate increased to 1300 t.i.d. as per Nephrology      Hyponatremia  Worse today and hyponatremia labs ordered including urine osmolality, urine sodium, serum osmolality  Nephrology following      Cirrhosis of liver with ascites  S/p transjugular liver biopsy and paracentesis at [facility]    Interstitial lung disease  Followed by outpatient Pulmonary Dr. Boyer, chest x-ray with continued changes  Pulmonary here have ordered serology workup, follow-up  Continue p.r.n. antitussives as ordered      Diabetes mellitus due to underlying condition with chronic kidney disease, with long-term current use of insulin  Continue basal bolus insulin with Accu-Cheks  As needed hypoglycemic measures  Previous HbA1c 6.6%      Gout        Personal history of immunosupression therapy  Now with fever.  Continuing chronic immunosuppressant      Hypertension  Controlled      Kidney transplant recipient          VTE Risk Mitigation (From admission, onward)         Ordered     IP VTE HIGH RISK PATIENT  Once         04/07/22 1800     Place sequential compression device  Until discontinued         04/07/22 1800                Discharge Planning   LE:      Code Status: Full Code   Is the patient medically ready for discharge?:     Reason for patient still in hospital (select all that apply): Patient new problem  Discharge Plan A: Home, Home with family                  Nori Lezama MD  Department of Hospital Medicine   [facility]   no

## 2022-05-25 NOTE — ASU PATIENT PROFILE, ADULT - NSALCOHOLLASTUSE_GEN_A_CORE_DT
Death Pronouncement Note    Patient lying in bed, mouth open, eyes open. Pupils fixed and equal bilaterally. No response to verbal or painful stimuli. No heart or lung sounds auscultated. No carotid or peripheral pulses.     Death officially pronounced at [id number], 5/25/2022    Medical Examiner notified: No, does not meet criteria    Family Notified: Kilo Leon DO, PGY-1  [facility] Emergency Medicine
14-Feb-2018

## 2022-06-13 NOTE — ED ADULT NURSE NOTE - CAS EDP DISCH TYPE
What Type Of Note Output Would You Prefer (Optional)?: Standard Output Hpi Title: Evaluation of Skin Lesions How Severe Are Your Spot(S)?: moderate Have Your Spot(S) Been Treated In The Past?: has been treated Home

## 2022-06-16 NOTE — ED CDU PROVIDER INITIAL DAY NOTE - ENDOCRINE NEGATIVE STATEMENT, MLM
ID band present and verified. Family is in the waiting area. Dolly Pedroza- [phone]. no diabetes and no thyroid trouble.

## 2022-06-17 NOTE — ASU PREOP CHECKLIST - BP NONINVASIVE DIASTOLIC (MM HG)
Quality 110: Preventive Care And Screening: Influenza Immunization: Influenza Immunization Administered during Influenza season Quality 111:Pneumonia Vaccination Status For Older Adults: Pneumococcal Vaccination Previously Received Quality 130: Documentation Of Current Medications In The Medical Record: Current Medications Documented Detail Level: Detailed Quality 431: Preventive Care And Screening: Unhealthy Alcohol Use - Screening: Patient not identified as an unhealthy alcohol user when screened for unhealthy alcohol use using a systematic screening method 58

## 2022-10-24 ENCOUNTER — APPOINTMENT (OUTPATIENT)
Dept: CARDIOLOGY | Facility: CLINIC | Age: 77
End: 2022-10-24

## 2022-10-24 PROCEDURE — 93306 TTE W/DOPPLER COMPLETE: CPT

## 2022-11-22 NOTE — PATIENT PROFILE ADULT - NSASFALLATTEMPTOOB_GEN_A_NUR
Occupational Therapy Visit    Visit Type: Daily Treatment Note  Visit: 3  Referring Provider: Stacy Wellington MD  Medical Diagnosis (from order): Diagnosis Information    Diagnosis  V45.89 (ICD-9-CM) - Z98.890 (ICD-10-CM) - Status post surgery         SUBJECTIVE                                                                                                               I feel like the motion has gotten better and the swelling has gone down. Pain / Symptoms  - Pain rating (out of 10): Current: 1      OBJECTIVE                                                                                                                                       Treatment     Therapeutic Exercise  Thumb Opposition PROM x 3, 30 seconds each, then AROM to index finger x 10  Finger Spreading - 10 reps  Seated Finger PIP PROM to all digits, 10 reps  Seated Finger DIP PROM to all digits - 10 reps  Seated Finger MP PROM to all digits - 10 reps  Seated Passive Composite Digit Flexion  - 10 reps, followed by AROM and place and holds  Seated Composite Thumb Flexion PROM followed by AROM - 10 reps  Hand PROM Finger Extension 3 sets - 60 hold             Skilled input: verbal instruction/cues    Writer verbally educated and received verbal consent for hand placement, positioning of patient, and techniques to be performed today from patient     Home Exercise Program  Access Code: [facility]  URL: [email or web address]  Date: 11/22/2022  Prepared by: Iqra López    Exercises  Â· Wrist Tendon Gliding - 6 x daily - 7 x weekly - 3 sets - 10 reps  Â· Thumb Opposition - 6 x daily - 7 x weekly - 3 sets - 10 reps  Â· Finger Spreading - 6 x daily - 7 x weekly - 3 sets - 10 reps  Â· Seated Finger PIP PROM - 6 x daily - 7 x weekly - 3 sets - 10 reps  Â· Seated Finger DIP PROM - 6 x daily - 7 x weekly - 3 sets - 10 reps  Â· Seated Finger MP PROM - 6 x daily - 7 x weekly - 3 sets - 10 reps  Â· Seated Passive Composite Digit Flexion - 6 x daily - 7 x weekly - 3 sets - 10 reps  Â· Seated Composite Thumb Flexion PROM - 6 x daily - 7 x weekly - 3 sets - 10 reps  Â· Hand PROM Finger Extension - 6 x daily - 7 x weekly - 3 sets - 60 hold                     ASSESSMENT                                                                                                            Pt presents to OT 13 days s/p left palmar fasciotomy to thumb, index finger, ring finger, and small finger. Stitches removed this A. M. with instruction from MD to progress with PROM. Today's session focused on isolated joint PROM followed with AROM, AAROM and place and holds. Tobi Lama was able to fully extend his thumb, index finger, and middle finger actively at and come within 1CM of palm with PROM into composite flexion by the end of the session. HEP upgraded and A/AA/PROM emphasized to increase flexibility with AROM. Tobi Lama continues to benefit from skilled OT to maximize left hand use and flexibility post op.    Pain/symptoms after session (out of 10): 1  Education:   - Results of above outlined education: Verbalizes understanding and Needs reinforcement    PLAN                                                                                                                           Suggestions for next session as indicated: Per palmar fasciotomy protocol  davy EAST tape  HEP review  A/AA/PROM to all digits       Therapy procedure time and total treatment time can be found documented on the Time Entry flowsheet no

## 2022-12-15 NOTE — ASU PATIENT PROFILE, ADULT - PAIN RATING AT REST
Phentermine Pending    Insurance response  Prescription Drug Insurance: [facility]  Notes: Prior authorization submitted - will update provider when decision has been made by insurance.            6

## 2023-01-09 ENCOUNTER — APPOINTMENT (OUTPATIENT)
Dept: CARDIOLOGY | Facility: CLINIC | Age: 78
End: 2023-01-09
Payer: MEDICARE

## 2023-01-09 VITALS — WEIGHT: 211.06 LBS | BODY MASS INDEX: 29.55 KG/M2 | HEIGHT: 71 IN

## 2023-01-09 VITALS — DIASTOLIC BLOOD PRESSURE: 70 MMHG | HEART RATE: 70 BPM | SYSTOLIC BLOOD PRESSURE: 100 MMHG

## 2023-01-09 DIAGNOSIS — I10 ESSENTIAL (PRIMARY) HYPERTENSION: ICD-10-CM

## 2023-01-09 DIAGNOSIS — R60.9 EDEMA, UNSPECIFIED: ICD-10-CM

## 2023-01-09 DIAGNOSIS — M06.9 RHEUMATOID ARTHRITIS, UNSPECIFIED: ICD-10-CM

## 2023-01-09 DIAGNOSIS — N52.9 MALE ERECTILE DYSFUNCTION, UNSPECIFIED: ICD-10-CM

## 2023-01-09 DIAGNOSIS — I25.2 OLD MYOCARDIAL INFARCTION: ICD-10-CM

## 2023-01-09 DIAGNOSIS — J44.9 CHRONIC OBSTRUCTIVE PULMONARY DISEASE, UNSPECIFIED: ICD-10-CM

## 2023-01-09 DIAGNOSIS — R06.02 SHORTNESS OF BREATH: ICD-10-CM

## 2023-01-09 DIAGNOSIS — I77.819 AORTIC ECTASIA, UNSPECIFIED SITE: ICD-10-CM

## 2023-01-09 PROCEDURE — 99204 OFFICE O/P NEW MOD 45 MIN: CPT

## 2023-01-09 RX ORDER — FEBUXOSTAT 40 MG/1
40 TABLET ORAL
Qty: 30 | Refills: 0 | Status: DISCONTINUED | COMMUNITY
Start: 2017-08-28 | End: 2023-01-09

## 2023-01-09 RX ORDER — SIMVASTATIN 20 MG/1
20 TABLET, FILM COATED ORAL
Qty: 30 | Refills: 5 | Status: ACTIVE | COMMUNITY
Start: 2016-11-28 | End: 1900-01-01

## 2023-01-09 RX ORDER — VALACYCLOVIR 1 G/1
1 TABLET, FILM COATED ORAL
Qty: 60 | Refills: 0 | Status: DISCONTINUED | COMMUNITY
Start: 2017-03-27 | End: 2023-01-09

## 2023-01-09 RX ORDER — ALLOPURINOL 300 MG/1
300 TABLET ORAL
Qty: 90 | Refills: 0 | Status: DISCONTINUED | COMMUNITY
Start: 2017-09-22 | End: 2023-01-09

## 2023-01-09 RX ORDER — TAMSULOSIN HYDROCHLORIDE 0.4 MG/1
0.4 CAPSULE ORAL
Qty: 90 | Refills: 3 | Status: DISCONTINUED | COMMUNITY
Start: 2020-02-19 | End: 2023-01-09

## 2023-01-09 NOTE — ASSESSMENT
[FreeTextEntry1] : pt get cholesterol level \par get 2 d echo \par medicine stress nuclear \par sob ? anginal equivalent \par stop candesartan for now

## 2023-01-09 NOTE — PHYSICAL EXAM
[Normal Venous Pressure] : normal venous pressure [Normal S1, S2] : normal S1, S2 [Clear Lung Fields] : clear lung fields [Soft] : abdomen soft [No Edema] : no edema Statement Selected [de-identified] : RRR

## 2023-01-09 NOTE — CARDIOLOGY SUMMARY
[de-identified] : 10/4/22: \par LVEF 50%, LVOT VTI: 22.4 CM, e' sept: 0.07 m/s bradycardic 48 to 50 bpm, severe LAE \par mild sclerosis.

## 2023-01-09 NOTE — HISTORY OF PRESENT ILLNESS
[FreeTextEntry1] : PT WITH OLD MI, CAD, DILATED AORTA, CKDZ, HTN, HLD bladder ca s/p bladder removal with urinary bag, lung cancer s/p wedge resection with chemotherapy 2019. \par \par \par cath: 2015: lad 60%, RCA: 100%, OM1 50% dz\par 5/21: dilated aortic root 3.9 cm \par \par pt here for establish cv care use to see dr. 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 \par pt bp on lower side and says not dizzy. \par HDL 36 and LDL 52

## 2023-01-09 NOTE — DISCUSSION/SUMMARY
[FreeTextEntry1] : pt get cholesterol level \par medicine stress nuclear \par sob ? anginal equivalent \par stop candesartan for now as bp on lower side \par get bloodwork

## 2023-01-31 NOTE — ASU PREOP CHECKLIST - STERILIZATION AFFIRMATION
From: Juventino Anders  To: Jonathan Minor  Sent: 1/31/2023 1:02 PM CST  Subject: Alphonso Nichole    I’m having a lot of discomfort in both of my elbows,do you think you can order at least X-rays for me@ [facility] imaging center?     Thank you    Initial discharge planning assessment completed.  Three of patient's adult children at the bedside but not caregiver (Lesley Swartz).  Per daughter, Bushra Terrell, patient had been moderately independent prior to admit, e.g. able to dress self, bath, and feed self, drive short distances.  Patient lives with her daughter, Lesley Swartz.  Patient has a walker;  Per daughter, Bushra, patient would prefer morning appointment and they were not sure of which pharmacy patient prefers to use but provided [facility] until patient could advise of which she would prefer to use.  Per family, patient will have one person in her home to help (that would be the daughter that she lives with).  Siblings were not sure if anyone has POA.   Discharge Disposition:   Discharge planning will be on-going to develop a safe discharge plan.       10/01/18 9525   Discharge Assessment   Assessment Type Discharge Planning Assessment   Confirmed/corrected address and phone number on facesheet? Yes   Assessment information obtained from? Other  (Adult children at the bedside; daughter, Bushra Terrell.)   Communicated expected length of stay with patient/caregiver no   Prior to hospitilization cognitive status: Alert/Oriented   Prior to hospitalization functional status: Assistive Equipment;Independent   Current cognitive status: Unable to Assess   Current Functional Status: Needs Assistance   Facility Arrived From: home   Lives With child(radha), adult  (Daughter; Lesley Swartz; [phone])   Able to Return to Prior Arrangements unable to determine at this time (comments)   Is patient able to care for self after discharge? Unable to determine at this time (comments)   Who are your caregiver(s) and their phone number(s)? Lesley Swartz; daughter; [phone] (lives in home);   Rafael Quiñonez; son; [phone];    Patient's perception of discharge disposition admitted as an inpatient   Readmission Within The Last 30 Days no previous  admission in last 30 days   Patient currently being followed by outpatient case management? No   Patient currently receives any other outside agency services? No   Does the patient have transportation home? Yes   Transportation Available family or friend will provide   Dialysis Name and Scheduled days n/a   Does the patient receive services at the Coumadin Clinic? No   Discharge Plan A (TBD)   Discharge Plan B (TBD)   Patient/Family In Agreement With Plan yes  (Adult children at the bedside during assessment.  Caregiver, Lesley, was not at the bedside during this assessment.  No sure if anyone has POA.  )   Micaela Melendez LMSW, ACARON-Sw, [facility]  10/01/2019       n/a

## 2023-05-04 NOTE — ASU PATIENT PROFILE, ADULT - BLOOD TRANSFUSION, PREVIOUS, PROFILE
no Ear Star Wedge Flap Text: The defect edges were debeveled with a #15 blade scalpel.  Given the location of the defect and the proximity to free margins (helical rim) an ear star wedge flap was deemed most appropriate. Using a sterile surgical marker, the appropriate flap was drawn incorporating the defect and placing the expected incisions between the helical rim and antihelix where possible.  The area thus outlined was incised through and through with a #15 scalpel blade. Following this, the designed flap was carried over into the primary defect and sutured into place.

## 2023-06-14 NOTE — H&P PST ADULT - NSALCOHOLPROBLEMSRELYN_GEN_A_CORE_SD
How Severe Are Your Spot(S)?: mild Have Your Spot(S) Been Treated In The Past?: has not been treated Hpi Title: Evaluation of Skin Lesions no

## 2024-10-31 NOTE — ASU PATIENT PROFILE, ADULT - NS PRO TALK SOMEONE YN
[Visual inspection, sensory exam] : Foot exam, including visual inspection, sensory exam with mono filament, and pulse exam, was performed within the last 12 months no

## 2025-03-30 NOTE — H&P PST ADULT - TOBACCO USE
balance training/bed mobility training/gait training/strengthening/transfer training balance training/bed mobility training/gait training/strengthening/transfer training Never smoker

## 2025-07-07 NOTE — ASU PATIENT PROFILE, ADULT - ABLE TO REACH PT
Unable to LVM regarding sleep study results. Sent portal message with results and recommendations.    yes

## 2025-08-22 ENCOUNTER — APPOINTMENT (OUTPATIENT)
Dept: OTOLARYNGOLOGY | Facility: CLINIC | Age: 80
End: 2025-08-22